# Patient Record
Sex: FEMALE | Employment: FULL TIME | URBAN - METROPOLITAN AREA
[De-identification: names, ages, dates, MRNs, and addresses within clinical notes are randomized per-mention and may not be internally consistent; named-entity substitution may affect disease eponyms.]

---

## 2019-07-25 ENCOUNTER — OFFICE VISIT (OUTPATIENT)
Dept: ORTHOPEDIC SURGERY | Age: 34
End: 2019-07-25

## 2019-07-25 VITALS
OXYGEN SATURATION: 98 % | HEIGHT: 70 IN | TEMPERATURE: 98.3 F | BODY MASS INDEX: 27.92 KG/M2 | WEIGHT: 195 LBS | DIASTOLIC BLOOD PRESSURE: 82 MMHG | HEART RATE: 95 BPM | SYSTOLIC BLOOD PRESSURE: 121 MMHG

## 2019-07-25 DIAGNOSIS — M54.2 NONSPECIFIC PAIN IN THE NECK REGION: ICD-10-CM

## 2019-07-25 DIAGNOSIS — Z79.899 ENCOUNTER FOR LONG-TERM (CURRENT) USE OF OTHER MEDICATIONS: ICD-10-CM

## 2019-07-25 DIAGNOSIS — M54.12 CHRONIC CERVICAL RADICULOPATHY: Primary | ICD-10-CM

## 2019-07-25 RX ORDER — TRAMADOL HYDROCHLORIDE 50 MG/1
50 TABLET ORAL
COMMUNITY
End: 2019-10-21 | Stop reason: SDUPTHER

## 2019-07-25 RX ORDER — CETIRIZINE HCL 10 MG
10 TABLET ORAL DAILY
COMMUNITY

## 2019-07-25 RX ORDER — ALPRAZOLAM 1 MG/1
1 TABLET ORAL
COMMUNITY

## 2019-07-25 RX ORDER — DEXTROAMPHETAMINE SACCHARATE, AMPHETAMINE ASPARTATE, DEXTROAMPHETAMINE SULFATE AND AMPHETAMINE SULFATE 7.5; 7.5; 7.5; 7.5 MG/1; MG/1; MG/1; MG/1
30 TABLET ORAL 3 TIMES DAILY
COMMUNITY

## 2019-07-25 RX ORDER — CYCLOBENZAPRINE HCL 10 MG
TABLET ORAL
Qty: 90 TAB | Refills: 0 | Status: SHIPPED | OUTPATIENT
Start: 2019-07-25 | End: 2019-10-21

## 2019-07-25 RX ORDER — TOPIRAMATE 50 MG/1
150 TABLET, FILM COATED ORAL DAILY
COMMUNITY

## 2019-07-25 RX ORDER — CYCLOBENZAPRINE HCL 10 MG
TABLET ORAL
COMMUNITY
End: 2019-10-21

## 2019-07-25 NOTE — PROGRESS NOTES
Lola [facility]alf[facility] [address].  [address], [address]  [place], [facility] [address]  Phone: [phone]  Fax: [phone]        Joan Rodriguez  : 1985  PCP: No primary care provider on file. NEW PATIENT      ASSESSMENT AND PLAN     Diagnoses and all orders for this visit:    1. Chronic cervical radiculopathy    2. Nonspecific pain in the neck region  -     AMB POC XRAY, SPINE, CERVICAL; 2 OR 3    3. Encounter for long-term (current) use of other medications  -     DRUG SCREEN UR - W/ CONFIRM; Future    Other orders  -     cyclobenzaprine (FLEXERIL) 10 mg tablet; Take 1 tab PO TID PRN spasms       1. Advised to stay active as tolerated. Do HEP   2. Note for ergonomic evaluation of work station. 3. Discussed that we are not a pain management facility, we are unable to fill chronic daily opioids. 4. PA & ORT & UDS done today   5. Continue PRN Flexeril and Tramadol  6. Avoid overhead lifting or reaching. 7. Discussed possibly pursuing PM with Dr. Dianna Zamarripa as he can do C [place] also  8. Given information on cervical and upper back exerices    F/U 3 months      CHIEF COMPLAINT  Laney Maurer is seen today as a self referral for complaints of neck and RUE pain. HISTORY OF PRESENT ILLNESS  Laney Maurer is a 29 y.o. female. RHD. Today pt c/o neck and intermittent RUE pain of 8 year duration. Pt has had trauma that caused pain. Pt reports she was in an [facility] , but did not have pain at that time. Pt notes infrequent spells of R shoulder and arm pain. She admits to cervicogenic headaches. She c/o neck pain with prolonged sitting, particularly in hunched positions. Pt notes a pupillary problem in R eye that is being evaluated and treated. Location of pain: neck ache  Does pain radiate into extremities: intermittent R hand numbness. Intermittent RUE pain. Does patient have weakness: no   Pt denies saddle paresthesias.     Medications pt is on: Flexeril 10mg a few days every month or so. Tramadol 50mg PRN rare use. Topamax 150mg QHS for migraines. Xanax. Pt denies recent ED visits or hospitalizations. Denies persistent fevers, chills, weight changes, neurogenic bowel or bladder symptoms. Treatments patient has tried:  Physical therapy:Yes  Doing HEP: Yes  Non-opioid medications: Yes Failed Elavil  Spinal injections: No. TPI no benefit  Spinal surgery- No.   Last C MRI 2017: R sided narrowing C4-5-6     reviewed. Last analgesic 8/2018 #[address] [place].  PMHx of migraines, ADD, anxiety. Pt sees [facility] for mental health. Pt moved to [place] recently from [place]. 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 Pain Assessment  7/25/2019   Location of Pain Neck;Finger   Location Modifiers Right   Severity of Pain 2   Quality of Pain Dull;Aching   Duration of Pain Persistent   Frequency of Pain Constant   Aggravating Factors Other (Comment)   Relieving Factors Rest;NSAID         PAST MEDICAL HISTORY   Past Medical History:   Diagnosis Date    Migraines        History reviewed. No pertinent surgical history. MEDICATIONS      Current Outpatient Medications:     ALPRAZolam (XANAX) 1 mg tablet, Take  by mouth., Disp: , Rfl:     topiramate (TOPAMAX) 50 mg tablet, Take  by mouth two (2) times a day., Disp: , Rfl:     dextroamphetamine-amphetamine (ADDERALL) 30 mg tablet, Take 30 mg by mouth., Disp: , Rfl:     cetirizine (ZYRTEC) 10 mg tablet, Take  by mouth., Disp: , Rfl:     traMADol (ULTRAM) 50 mg tablet, Take 50 mg by mouth every six (6) hours as needed for Pain., Disp: , Rfl:     cyclobenzaprine (FLEXERIL) 10 mg tablet, Take  by mouth three (3) times daily as needed for Muscle Spasm(s). , Disp: , Rfl:     cyclobenzaprine (FLEXERIL) 10 mg tablet, Take 1 tab PO TID PRN spasms, Disp: 90 Tab, Rfl: 0     ALLERGIES    Allergies   Allergen Reactions    Latex Rash    Penicillins Anaphylaxis    Doxycycline Unknown (comments)    Levofloxacin Unknown (comments)    Nsaids (Non-Steroidal Anti-Inflammatory Drug) Unknown (comments)    Sulfa (Sulfonamide Antibiotics) Unknown (comments)          SOCIAL HISTORY    Social History     Socioeconomic History    Marital status:      Spouse name: Not on file    Number of children: Not on file    Years of education: Not on file    Highest education level: Not on file   Occupational History    Not on file   Social Needs    Financial resource strain: Not on file    Food insecurity:     Worry: Not on file     Inability: Not on file    Transportation needs:     Medical: Not on file     Non-medical: Not on file   Tobacco Use    Smoking status: Smoker, Current Status Unknown    Smokeless tobacco: Never Used   Substance and Sexual Activity    Alcohol use: Not on file    Drug use: Not on file    Sexual activity: Not on file   Lifestyle    Physical activity:     Days per week: Not on file     Minutes per session: Not on file    Stress: Not on file   Relationships    Social connections:     Talks on phone: Not on file     Gets together: Not on file     Attends [employer] service: Not on file     Active member of club or organization: Not on file     Attends meetings of clubs or organizations: Not on file     Relationship status: Not on file    Intimate partner violence:     Fear of current or ex partner: Not on file     Emotionally abused: Not on file     Physically abused: Not on file     Forced sexual activity: Not on file   Other Topics Concern    Not on file   Social History Narrative    Not on file       FAMILY HISTORY  No family history on file. REVIEW OF SYSTEMS  Review of Systems   Constitutional: Negative for chills, fever and weight loss. Respiratory: Negative for shortness of breath. Cardiovascular: Negative for chest pain. Gastrointestinal: Negative for constipation. Negative for fecal incontinence   Genitourinary: Negative for dysuria.         Negative for urinary incontinence   Musculoskeletal: Per HPI   Skin: Negative for rash. Neurological: Positive for tingling and headaches. Negative for dizziness, tremors and focal weakness. Endo/Heme/Allergies: Does not bruise/bleed easily. Psychiatric/Behavioral: The patient does not have insomnia. PHYSICAL EXAMINATION  Visit Vitals  /82 (BP 1 Location: Left arm, BP Patient Position: Sitting)   Pulse 95   Temp 98.3 °F (36.8 °C) (Oral)   Ht 5' 10\" (1.778 m)   Wt 195 lb (88.5 kg)   SpO2 98%   BMI 27.98 kg/m²          Accompanied by self. Constitutional:  Well developed, well nourished, in no acute distress. Psychiatric: Affect and mood are appropriate. Integumentary: No rashes or abrasions noted on exposed areas. Cardiovascular/Peripheral Vascular: No peripheral edema is noted BLE. SPINE/MUSCULOSKELETAL EXAM    Cervical spine:  Neck is midline. Normal muscle tone. No focal atrophy is noted. Tenderness to palpation R occipital notch. Negative Spurling's sign. Negative Tinel's sign. Negative Ramos's sign. MOTOR:      Biceps  Triceps Deltoids Wrist Ext Wrist Flex Hand Intrin   Right +4/5 +4/5 +4/5 +4/5 +4/5 +4/5   Left +4/5 +4/5 +4/5 +4/5 +4/5 +4/5       DTRs are 1+ biceps, triceps, brachioradialis, patella, and Achilles. Ambulation without assistive device. FWB. RADIOGRAPHS  Cervical spine xray films reviewed:  1) NSS     Written by Kassidy Akers, as dictated by Primo Hutson MD.    I, Dr. Primo Hutson MD, confirm that all documentation is accurate. Ms. Zackary Greene may have a reminder for a \"due or due soon\" health maintenance. I have asked that she contact her primary care provider for follow-up on this health maintenance.

## 2019-07-25 NOTE — LETTER
7/25/2019 2:15 PM 
 
Ms. Calvin Salomon 
[address] Located within [facility] [phone] [id number] To Whom It May Concern: 
 
Eleonora Dahl is currently under the care of [address] [place] [address]. She was seen in the office today. She has chronic cervical radiculopathy and would benefit from an ergonomic evaluation of her work station. If there are questions or concerns please have the patient contact our office. Sincerely, Rebecca Oliver MD

## 2019-07-25 NOTE — PATIENT INSTRUCTIONS
Neck: Exercises  Introduction  Here are some examples of exercises for you to try. The exercises may be suggested for a condition or for rehabilitation. Start each exercise slowly. Ease off the exercises if you start to have pain. You will be told when to start these exercises and which ones will work best for you. How to do the exercises  Neck stretch    1. This stretch works best if you keep your shoulder down as you lean away from it. To help you remember to do this, start by relaxing your shoulders and lightly holding on to your thighs or your chair. 2. Tilt your head toward your shoulder and hold for 15 to 30 seconds. Let the weight of your head stretch your muscles. 3. If you would like a little added stretch, use your hand to gently and steadily pull your head toward your shoulder. For example, keeping your right shoulder down, lean your head to the left. 4. Repeat 2 to 4 times toward each shoulder. Diagonal neck stretch    1. Turn your head slightly toward the direction you will be stretching, and tilt your head diagonally toward your chest and hold for 15 to 30 seconds. 2. If you would like a little added stretch, use your hand to gently and steadily pull your head forward on the diagonal.  3. Repeat 2 to 4 times toward each side. Dorsal glide stretch    1. Sit or stand tall and look straight ahead. 2. Slowly tuck your chin as you glide your head backward over your body  3. Hold for a count of 6, and then relax for up to 10 seconds. 4. Repeat 8 to 12 times. Chest and shoulder stretch    1. Sit or stand tall and glide your head backward as in the dorsal glide stretch. 2. Raise both arms so that your hands are next to your ears. 3. Take a deep breath, and as you breathe out, lower your elbows down and behind your back. You will feel your shoulder blades slide down and together, and at the same time you will feel a stretch across your chest and the front of your shoulders.   4. Hold for about 6 seconds, and then relax for up to 10 seconds. 5. Repeat 8 to 12 times. Strengthening: Hands on head    1. Move your head backward, forward, and side to side against gentle pressure from your hands, holding each position for about 6 seconds. 2. Repeat 8 to 12 times. Follow-up care is a key part of your treatment and safety. Be sure to make and go to all appointments, and call your doctor if you are having problems. It's also a good idea to know your test results and keep a list of the medicines you take. Where can you learn more? Go to http://jozef-grace.info/. Enter P975 in the search box to learn more about \"Neck: Exercises. \"  Current as of: September 20, 2018  Content Version: 12.1  © 9357-8875 Healthwise, Incorporated. Care instructions adapted under license by [employer] (which disclaims liability or warranty for this information). If you have questions about a medical condition or this instruction, always ask your healthcare professional. [address] any warranty or liability for your use of this information. Healthy Upper Back: Exercises  Introduction  Here are some examples of exercises for your upper back. Start each exercise slowly. Ease off the exercise if you start to have pain. Your doctor or physical therapist will tell you when you can start these exercises and which ones will work best for you. How to do the exercises  Lower neck and upper back stretch    1. Stretch your arms out in front of your body. Clasp one hand on top of your other hand. 2. Gently reach out so that you feel your shoulder blades stretching away from each other. 3. Gently bend your head forward. 4. Hold for 15 to 30 seconds. 5. Repeat 2 to 4 times. Midback stretch    1. Kneel on the floor, and sit back on your ankles. 2. Lean forward, place your hands on the floor, and stretch your arms out in front of you.  Rest your head between your arms.  3. Gently push your chest toward the floor, reaching as far in front of you as possible. 4. Hold for 15 to 30 seconds. 5. Repeat 2 to 4 times. Shoulder rolls    1. Sit comfortably with your feet shoulder-width apart. You can also do this exercise while standing. 2. Roll your shoulders up, then back, and then down in a smooth, circular motion. 3. Repeat 2 to 4 times. Wall push-up    1. Stand against a wall with your feet about 12 to 24 inches back from the wall. If you feel any pain when you do this exercise, stand closer to the wall. 2. Place your hands on the wall slightly wider apart than your shoulders, and lean forward. 3. Gently lean your body toward the wall. Then push back to your starting position. Keep the motion smooth and controlled. 4. Repeat 8 to 12 times. Resisted shoulder blade squeeze    1. Sit or stand, holding the band in both hands in front of you. Keep your elbows close to your sides, bent at a 90-degree angle. Your palms should face up. 2. Squeeze your shoulder blades together, and move your arms to the outside, stretching the band. Be sure to keep your elbows at your sides while you do this. 3. Relax. 4. Repeat 8 to 12 times. Resisted rows    1. Put the band around a solid object, such as a bedpost, at about waist level. Hold one end of the band in each hand. 2. With your elbows at your sides and bent to 90 degrees, pull the band back to move your shoulder blades toward each other. Return to the starting position. 3. Repeat 8 to 12 times. Follow-up care is a key part of your treatment and safety. Be sure to make and go to all appointments, and call your doctor if you are having problems. It's also a good idea to know your test results and keep a list of the medicines you take. Where can you learn more? Go to http://jozef-grace.info/. Enter A410 in the search box to learn more about \"Healthy Upper Back: Exercises. \"  Current as of: September 20, 2018  Content Version: 12.1  © 8557-3494 Healthwise, Incorporated. Care instructions adapted under license by [employer] (which disclaims liability or warranty for this information). If you have questions about a medical condition or this instruction, always ask your healthcare professional. [address] any warranty or liability for your use of this information.

## 2019-07-30 ENCOUNTER — TELEPHONE (OUTPATIENT)
Dept: ORTHOPEDIC SURGERY | Age: 34
End: 2019-07-30

## 2019-07-30 ENCOUNTER — DOCUMENTATION ONLY (OUTPATIENT)
Dept: ORTHOPEDIC SURGERY | Age: 34
End: 2019-07-30

## 2019-07-30 RX ORDER — NALOXONE HYDROCHLORIDE 4 MG/.1ML
SPRAY NASAL
Qty: 2 EACH | Refills: 0 | Status: SHIPPED | OUTPATIENT
Start: 2019-07-30 | End: 2021-12-14

## 2019-07-30 NOTE — TELEPHONE ENCOUNTER
Spoke with patient, informed of NP [place] message from previous message stating that due to her taking Xanax along with Tramadol, we have to prescribe Narcan for accidental overdose. Patient stated understanding and that she thought that was only necessary with opioid medications not Tramadol. No further actions needed at this time.

## 2019-07-30 NOTE — PROGRESS NOTES
Spoke with patient, informed of NP [place] message. She stated understanding, no further actions needed at this time.

## 2019-07-30 NOTE — TELEPHONE ENCOUNTER
Patient called asking why she received a prescription for Narcan.  Please advise patient at [phone]

## 2019-07-30 NOTE — PROGRESS NOTES
UDS done on 7/25/19  negative for ultram (last dose was 2 days prior)    should get repeat UDS at next visit    UDS was + for xanax. Pt needs rx for narcan. I will send it into the pharmacy. Please call pt and let her know.

## 2019-10-21 ENCOUNTER — OFFICE VISIT (OUTPATIENT)
Dept: ORTHOPEDIC SURGERY | Age: 34
End: 2019-10-21

## 2019-10-21 ENCOUNTER — DOCUMENTATION ONLY (OUTPATIENT)
Dept: ORTHOPEDIC SURGERY | Age: 34
End: 2019-10-21

## 2019-10-21 VITALS
TEMPERATURE: 98.4 F | BODY MASS INDEX: 28.03 KG/M2 | RESPIRATION RATE: 20 BRPM | HEART RATE: 96 BPM | HEIGHT: 70 IN | SYSTOLIC BLOOD PRESSURE: 126 MMHG | DIASTOLIC BLOOD PRESSURE: 68 MMHG | WEIGHT: 195.8 LBS

## 2019-10-21 DIAGNOSIS — M54.12 CHRONIC CERVICAL RADICULOPATHY: Primary | ICD-10-CM

## 2019-10-21 RX ORDER — TRAMADOL HYDROCHLORIDE 50 MG/1
50 TABLET ORAL
Qty: 30 TAB | Refills: 0 | Status: SHIPPED | OUTPATIENT
Start: 2019-10-21 | End: 2019-11-20

## 2019-10-21 NOTE — PROGRESS NOTES
Pt dropped off FMLA forms to be completed. Pt informed 7-10 bus days, and will pick forms up when completed.     FORMS SCANNED INTO CHART!!!!

## 2019-10-21 NOTE — PROGRESS NOTES
Lola Arredondo [facility] [address]  [address], [address]  [place], [facility]  Phone: [phone]  Fax: [phone]        Mc Sepulveda  : 1985  PCP: Bonnita Bamberger., MD    PROGRESS NOTE      ASSESSMENT AND PLAN    Diagnoses and all orders for this visit:    1. Chronic cervical radiculopathy  -     traMADol (ULTRAM) 50 mg tablet; Take 1 Tab by mouth daily as needed for Pain for up to 30 days. Indications: Neuropathic Pain  -     MRI CERV SPINE WO CONT; Future       1. 35yo RHD  with worsening neck pain, loss of dexterity, weakness. 2. Safe use of opioids discussed with pt. 3. MRI cervical spine - increased neck pain, N/T/W bilateral UEs  4. Discussed that we are not a chronic pain management facility, we are unable to fill on-going chronic opioid medications. 5. Rx for Tramadol 30 tab, no refill  6. Discussed life style modification, PT, medication, spinal injection, and surgery as treatment options   7. Given information on neuropathic pain  8. Will await MRI and fill out FMLA accordingly. F/U after MRI    Risks and benefits of ongoing opiate therapy have been reviewed with the patient. Per review of available records and patients , there are not signs of overuse, misuse, diversion, or concerning side effects. UDS results have been consistent. Pt has a good risk to benefit ratio which allows the pt to function in a home environment without side effects. HISTORY OF PRESENT ILLNESS  Rianna Brand is a 36 y.o. female. RHD. Pt presents to the office for a f/u visit for chronic neck pain and medication management. I last saw her about 3 months ago. Feels she's getting worse. She would like to avoid cortisone injections. Pt reports increased neck pain with work currently. She c/o spasms, which worsen her migraines. She notes she had an ergonomic evaluation, but nothing has been changed yet.  Her chair adjusts now, but her monitor does not move. She states her work table is still too low. She admits to weakness in her hands, drops objects, and states this worsens through the day. Pt reports pain does intermittently radiate into the RUE with numbness. She notes she has been tripping over her R foot. Admits to slight imbalance. Pt denies saddle paresthesias. She admits she has missed a couple days of work due to upper back spasm and would like to fill out FMLA. Pt states she has been using Topamax 150mg QHS for migraines, rare Tramadol 50mg PRN -past few weeks 5 days per week, (still using Rx from [place]), Flexeril 10mg PRN with some relief. Pt denies any dizziness, confusion, uncontrolled constipation, and cravings due to controlled substances. Pt denies recent ED visits or hospitalizations. Denies persistent fevers, chills, weight changes, neurogenic bowel or bladder symptoms. PMHx of migraines, ADD, anxiety. Pt sees [facility] for mental health. Pt moved to [facility] from [place]. 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 Does not heavy lifting. Has to stay in forward flexed positions which aggravate her neck. Computer not at eye level. Pain effects sleep, work, standing, play and recreational activities, and her ability to perform ADLs. Pt has tried:  Physical therapy:Yes  Doing HEP: Yes  Non-opioid medications: Yes Failed Elavil, Baclofen, Skelaxin, Robaxin, Tizanidine,   Spinal injections: No. TPI no benefit  Spinal surgery- No.   Last C MRI 2017: R sided narrowing C4-5-6    Opioid Assessment    Opioid Risk Tool Reviewed: YES, Score = 1  Aberrant behaviors: None. Urine Drug Screen: reviewed and up to date. Controlled substance agreement on file: Yes     reviewed:yes No rx for Tramadol noted since last visit. No other opioids. Concomitant use of a benzodiazepine: no  Naloxone prescription is warranted. It has been provided or is already on file. Reports that pain would be a 5-7/10 w/out medication and that it goes down to a 3/10 after medication. This enables the pt to be functional, achieve ADLs and engage in social activities. Pain Assessment  10/21/2019   Location of Pain Neck;Finger; Foot   Location Modifiers Right;Left   Severity of Pain 5   Quality of Pain Aching; Sharp   Quality of Pain Comment NUMBNESS   Duration of Pain Persistent   Frequency of Pain Constant   Aggravating Factors (No Data)   Aggravating Factors Comment working at the computer, looking down   Limiting Behavior Some   Relieving Factors Rest       PAST MEDICAL HISTORY   Past Medical History:   Diagnosis Date    Migraines        History reviewed. No pertinent surgical history. Shantel Ask MEDICATIONS      Current Outpatient Medications   Medication Sig Dispense Refill    traMADol (ULTRAM) 50 mg tablet Take 1 Tab by mouth daily as needed for Pain for up to 30 days. Indications: Neuropathic Pain 30 Tab 0    naloxone (NARCAN) 4 mg/actuation nasal spray Use 1 spray intranasally, then discard. Repeat with new spray every 2 min as needed for opioid overdose symptoms, alternating nostrils. 2 Each 0    ALPRAZolam (XANAX) 1 mg tablet Take  by mouth.  topiramate (TOPAMAX) 50 mg tablet Take  by mouth two (2) times a day.  dextroamphetamine-amphetamine (ADDERALL) 30 mg tablet Take 30 mg by mouth.  cetirizine (ZYRTEC) 10 mg tablet Take  by mouth.           ALLERGIES    Allergies   Allergen Reactions    Latex Rash    Penicillins Anaphylaxis    Doxycycline Unknown (comments)    Levofloxacin Unknown (comments)    Nsaids (Non-Steroidal Anti-Inflammatory Drug) Unknown (comments)    Sulfa (Sulfonamide Antibiotics) Unknown (comments)          SOCIAL HISTORY    Social History     Socioeconomic History    Marital status:      Spouse name: Not on file    Number of children: Not on file    Years of education: Not on file    Highest education level: Not on file Occupational History    Not on file   Social Needs    Financial resource strain: Not on file    Food insecurity:     Worry: Not on file     Inability: Not on file    Transportation needs:     Medical: Not on file     Non-medical: Not on file   Tobacco Use    Smoking status: Smoker, Current Status Unknown    Smokeless tobacco: Never Used   Substance and Sexual Activity    Alcohol use: Not on file    Drug use: Not on file    Sexual activity: Not on file   Lifestyle    Physical activity:     Days per week: Not on file     Minutes per session: Not on file    Stress: Not on file   Relationships    Social connections:     Talks on phone: Not on file     Gets together: Not on file     Attends Orthodoxy service: Not on file     Active member of club or organization: Not on file     Attends meetings of clubs or organizations: Not on file     Relationship status: Not on file    Intimate partner violence:     Fear of current or ex partner: Not on file     Emotionally abused: Not on file     Physically abused: Not on file     Forced sexual activity: Not on file   Other Topics Concern    Not on file   Social History Narrative    Not on file       FAMILY HISTORY  History reviewed. No pertinent family history. REVIEW OF SYSTEMS  Review of Systems   Constitutional: Negative for chills, fever and weight loss. Respiratory: Negative for shortness of breath. Cardiovascular: Negative for chest pain. Gastrointestinal: Negative for constipation. Negative for fecal incontinence   Genitourinary: Negative for dysuria. Negative for urinary incontinence   Musculoskeletal:        Per HPI   Skin: Negative for rash. Neurological: Positive for tingling, focal weakness and headaches. Negative for dizziness and tremors. Endo/Heme/Allergies: Does not bruise/bleed easily. Psychiatric/Behavioral: The patient does not have insomnia.         PHYSICAL EXAMINATION  Visit Vitals  /68 (BP 1 Location: Left arm, BP Patient Position: Sitting)   Pulse 96   Temp 98.4 °F (36.9 °C) (Oral)   Resp 20   Ht 5' 10\" (1.778 m)   Wt 195 lb 12.8 oz (88.8 kg)   BMI 28.09 kg/m²         Accompanied by self. Constitutional:  Well developed, well nourished, in no acute distress. Psychiatric: Affect and mood are appropriate. Integumentary: No rashes or abrasions noted on exposed areas. Cardiovascular/Peripheral Vascular: No peripheral edema is noted BLE. SPINE/MUSCULOSKELETAL EXAM    Cervical spine:  Neck is midline. Normal muscle tone. No focal atrophy is noted. Tenderness to palpation B/L occipital notch, B/L upper trapezii. Negative Spurling's sign. Positive Tinel's sign L wrist and elbow. Negative Ramos's sign. MOTOR:      Biceps  Triceps Deltoids Wrist Ext Wrist Flex Hand Intrin   Right +4/5 +4/5 +4/5 +4/5 +4/5 4/5   Left +4/5 +4/5 +4/5 +4/5 +4/5 4/5   Pinch weakness bilaterally. DTRs are 1+ biceps, triceps, brachioradialis. Mild difficulty with tandem gait. Ambulation without assistive device. FWB. Written by Lucian Browne, as dictated by Sj Ahuja MD.    I, Dr. Sj Ahuja MD, confirm that all documentation is accurate. Ms. Allison Morton may have a reminder for a \"due or due soon\" health maintenance. I have asked that she contact her primary care provider for follow-up on this health maintenance.

## 2019-10-21 NOTE — PROGRESS NOTES
Verbal order entered per Dr. Cheko Oneal as documented on blue sheet:MRI C-spine due to increased neck pain, N/T/W bilateral upper extremities

## 2019-10-21 NOTE — PATIENT INSTRUCTIONS
Neuropathic Pain: Care Instructions  Your Care Instructions    Neuropathic pain is caused by pressure on or damage to your nerves. It's often simply called nerve pain. Some people feel this type of pain all the time. For others, it comes and goes. Diabetes, shingles, or an injury can cause nerve pain. Many people say the pain feels sharp, burning, or stabbing. But some people feel it as a dull ache. In some cases, it makes your skin very sensitive. So touch, pressure, and other sensations that did not hurt before may now cause pain. It's important to know that this kind of pain is real and can affect your quality of life. It's also important to know that treatment can help. Treatment includes pain medicines, exercise, and physical therapy. Medicines can help reduce the number of pain signals that travel over the nerves. This can make the painful areas less sensitive. It can also help you sleep better and improve your mood. But medicines are only one part of successful treatment. Most people do best with more than one kind of treatment. Your doctor may recommend that you try cognitive-behavioral therapy and stress management. Or, if needed, you may decide to try to quit smoking, lower your blood pressure, or better control blood sugar. These kinds of healthy changes can also make a difference. If you feel that your treatment is not working, talk to your doctor. And be sure to tell your doctor if you think you might be depressed or anxious. These are common problems that can also be treated. Follow-up care is a key part of your treatment and safety. Be sure to make and go to all appointments, and call your doctor if you are having problems. It's also a good idea to know your test results and keep a list of the medicines you take. How can you care for yourself at home? · Be safe with medicines. Read and follow all instructions on the label.   ? If the doctor gave you a prescription medicine for pain, take it as prescribed. ? If you are not taking a prescription pain medicine, ask your doctor if you can take an over-the-counter medicine. · Save hard tasks for days when you have less pain. Follow a hard task with an easy task. And remember to take breaks. · Relax, and reduce stress. You may want to try deep breathing or meditation. These can help. · Keep moving. Gentle, daily exercise can help reduce pain. Your doctor or physical therapist can tell you what type of exercise is best for you. This may include walking, swimming, and stationary biking. It may also include stretches and range-of-motion exercises. · Try heat, cold packs, and massage. · Get enough sleep. Constant pain can make you more tired. If the pain makes it hard to sleep, talk with your doctor. · Think positively. Your thoughts can affect your pain. Do fun things to distract yourself from the pain. See a movie, read a book, listen to music, or spend time with a friend. · Keep a pain diary. Try to write down how strong your pain is and what it feels like. Also try to notice and write down how your moods, thoughts, sleep, activities, and medicine affect your pain. These notes can help you and your doctor find the best ways to treat your pain. Reducing constipation caused by pain medicine  Pain medicines often cause constipation. To reduce constipation:  · Include fruits, vegetables, beans, and whole grains in your diet each day. These foods are high in fiber. · Drink plenty of fluids, enough so that your urine is light yellow or clear like water. If you have kidney, heart, or liver disease and have to limit fluids, talk with your doctor before you increase the amount of fluids you drink. · Get some exercise every day. Build up slowly to 30 to 60 minutes a day on 5 or more days of the week. · Take a fiber supplement, such as Citrucel or Metamucil, every day if needed. Read and follow all instructions on the label.   · Schedule time each day for a bowel movement. Having a daily routine may help. Take your time and do not strain when having a bowel movement. · Ask your doctor about a laxative. The goal is to have one easy bowel movement every 1 to 2 days. Do not let constipation go untreated for more than 3 days. When should you call for help? Call your doctor now or seek immediate medical care if:    · You feel sad, anxious, or hopeless for more than a few days. This could mean you are depressed. Depression is common in people who have a lot of pain. But it can be treated.     · You have trouble with bowel movements, such as:  ? No bowel movement in 3 days. ? Blood in the anal area, in your stool, or on the toilet paper. ? Diarrhea for more than 24 hours.    Watch closely for changes in your health, and be sure to contact your doctor if:    · Your pain is getting worse.     · You can't sleep because of pain.     · You are very worried or anxious about your pain.     · You have trouble taking your pain medicine.     · You have any concerns about your pain medicine or its side effects.     · You have vomiting or cramps for more than 2 hours. Where can you learn more? Go to http://jozef-grace.info/. Enter Z931 in the search box to learn more about \"Neuropathic Pain: Care Instructions. \"  Current as of: March 28, 2019  Content Version: 12.2  © 4435-3984 [employer]. Care instructions adapted under license by [employer] (which disclaims liability or warranty for this information). If you have questions about a medical condition or this instruction, always ask your healthcare professional. [place] [address] any warranty or liability for your use of this information.

## 2019-10-21 NOTE — PROGRESS NOTES
Favio Loza presents today for   Chief Complaint   Patient presents with    Neck Pain     fu       Is someone accompanying this pt? NO    Is the patient using any DME equipment during OV? NO    Depression Screening:  3 most recent PHQ Screens 10/21/2019   Little interest or pleasure in doing things Not at all   Feeling down, depressed, irritable, or hopeless Not at all   Total Score PHQ 2 0       Learning Assessment:  Learning Assessment 10/21/2019   PRIMARY LEARNER Patient   PRIMARY LANGUAGE ENGLISH   LEARNER PREFERENCE PRIMARY DEMONSTRATION   ANSWERED BY patient   RELATIONSHIP SELF         OPIOID RISK TOOL  Opioid Risk Tool 7/25/2019   Family history of alcohol abuse? 0   Family history of illegal drug abuse? 0   Family history of prescription drug abuse? 0   Personal history of alcohol abuse? 0   Personal history of illegal drug abuse? 0   Personal history of prescription drug abuse? 0   Age range between 17-45? 1   History of preadolescent sexual abuse? 0   ADD, OCD, bipolar, schizophrenia? 0   Depression? 0   Opioid Risk Total Score 1       Coordination of Care:  1. Have you been to the ER, urgent care clinic since your last visit? NO  Hospitalized since your last visit? NO    2. Have you seen or consulted any other health care providers outside of the [facility] since your last visit? NO Include any pap smears or colon screening.  NO    Last  Checked 10/21/19

## 2019-10-22 ENCOUNTER — DOCUMENTATION ONLY (OUTPATIENT)
Dept: ORTHOPEDIC SURGERY | Age: 34
End: 2019-10-22

## 2019-11-06 ENCOUNTER — DOCUMENTATION ONLY (OUTPATIENT)
Dept: ORTHOPEDIC SURGERY | Age: 34
End: 2019-11-06

## 2019-11-06 ENCOUNTER — OFFICE VISIT (OUTPATIENT)
Dept: ORTHOPEDIC SURGERY | Age: 34
End: 2019-11-06

## 2019-11-06 VITALS
HEART RATE: 98 BPM | DIASTOLIC BLOOD PRESSURE: 82 MMHG | WEIGHT: 192.4 LBS | OXYGEN SATURATION: 100 % | HEIGHT: 70 IN | SYSTOLIC BLOOD PRESSURE: 121 MMHG | RESPIRATION RATE: 20 BRPM | BODY MASS INDEX: 27.54 KG/M2 | TEMPERATURE: 98.3 F

## 2019-11-06 DIAGNOSIS — M79.18 MYOFASCIAL PAIN: ICD-10-CM

## 2019-11-06 DIAGNOSIS — R20.2 PARESTHESIA OF BOTH FEET: ICD-10-CM

## 2019-11-06 DIAGNOSIS — R20.2 PARESTHESIA OF BOTH HANDS: ICD-10-CM

## 2019-11-06 DIAGNOSIS — M47.812 CERVICAL SPONDYLOSIS: Primary | ICD-10-CM

## 2019-11-06 RX ORDER — DULOXETIN HYDROCHLORIDE 20 MG/1
20 CAPSULE, DELAYED RELEASE ORAL
Qty: 30 CAP | Refills: 1 | Status: SHIPPED | OUTPATIENT
Start: 2019-11-06 | End: 2020-07-28 | Stop reason: ALTCHOICE

## 2019-11-06 NOTE — PROGRESS NOTES
Dre Ann presents today for   Chief Complaint   Patient presents with    Neck Pain     MRI fu       Is someone accompanying this pt? NO    Is the patient using any DME equipment during OV? NO    Depression Screening:  3 most recent PHQ Screens 11/6/2019   Little interest or pleasure in doing things Not at all   Feeling down, depressed, irritable, or hopeless Not at all   Total Score PHQ 2 0       Learning Assessment:  Learning Assessment 10/21/2019   PRIMARY LEARNER Patient   PRIMARY LANGUAGE ENGLISH   LEARNER PREFERENCE PRIMARY DEMONSTRATION   ANSWERED BY patient   RELATIONSHIP SELF       Abuse Screening:  No flowsheet data found. Fall Risk  No flowsheet data found. OPIOID RISK TOOL  Opioid Risk Tool 7/25/2019   Family history of alcohol abuse? 0   Family history of illegal drug abuse? 0   Family history of prescription drug abuse? 0   Personal history of alcohol abuse? 0   Personal history of illegal drug abuse? 0   Personal history of prescription drug abuse? 0   Age range between 17-45? 1   History of preadolescent sexual abuse? 0   ADD, OCD, bipolar, schizophrenia? 0   Depression? 0   Opioid Risk Total Score 1       Coordination of Care:  1. Have you been to the ER, urgent care clinic since your last visit? NO  Hospitalized since your last visit? NO    2. Have you seen or consulted any other health care providers outside of the [address] [place] [address] since your last visit? NO Include any pap smears or colon screening.  NO    Last  Checked 11/6/19

## 2019-11-06 NOTE — PATIENT INSTRUCTIONS
Electromyogram (EMG) and Nerve Conduction Studies: About These Tests  What are they? An electromyogram (EMG) measures the electrical activity of your muscles when you are not using them (at rest) and when you tighten them (muscle contraction). Nerve conduction studies (NCS) measure how well and how fast the nerves can send electrical signals. EMG and nerve conduction studies are often done together. If they are done together, the nerve conduction studies are done before the EMG. Why are they done? You may need an EMG to find diseases that damage your muscles or nerves or to find why you cannot move your muscles (paralysis), why they feel weak, or why they twitch. You may need nerve conduction studies to find damage to the nerves that lead from the brain and spinal cord to the rest of the body (peripheral nervous system). Nerve conduction studies are often used to help find nerve disorders, such as carpal tunnel syndrome. How can you prepare for these tests? · Tell your doctors ALL the medicines, vitamins, supplements, and herbal remedies you take. Some medicines can affect the test results. You may need to stop taking some medicines before you have this test.     · If you take aspirin or some other blood thinner, be sure to talk to your doctor. He or she will tell you if you should stop taking it before your test. Make sure that you understand exactly what your doctor wants you to do.     · Wear loose-fitting clothing. You may be given a hospital gown to wear.     · The electrodes for the test are attached to your skin. Your skin needs to be clean and free of sprays, oils, creams, and lotions. What happens during the tests? You lie on a table or bed or sit in a reclining chair so your muscles are relaxed. For an EMG:  · Your doctor will insert a needle electrode into a muscle.  This will record the electrical activity while the muscle is at rest. You may feel a quick, sharp pain when the needle electrode is put into a muscle. · Your doctor will ask you to tighten the same muscle slowly and steadily while the electrical activity is recorded. · Your doctor may move the electrode to a different area of the muscle or a different muscle. For nerve conduction studies:  · Your doctor will attach two types of electrodes to your skin. ? One type of electrode is placed over a nerve and will give the nerve an electrical pulse. ? The other type of electrode is placed over the muscle that the nerve controls. It will record how long it takes the muscle to react to the electrical pulse. · You will be able to feel the electrical pulses. They are small shocks and are safe. What else should you know about these tests? · After an EMG, you may be sore and have a tingling feeling in your muscles for up to 2 days. You may have small bruises or swelling at the needle site. · For an EMG, you may be asked to sign a consent form. Talk to your doctor about any concerns you have about the need for the test, its risks, how it will be done, or what the results will mean. How long do they take? · An EMG may take 30 to 60 minutes. · Nerve conduction tests may take from 15 minutes to 1 hour or more. It depends on how many nerves and muscles your doctor tests. What happens after these tests? · If any of the test areas are sore:  ? Put ice or a cold pack on the area for 10 to 20 minutes at a time. Put a thin cloth between the ice and your skin. ? Take an over-the-counter pain medicine, such as acetaminophen (Tylenol), ibuprofen (Advil, Motrin), or naproxen (Aleve). Be safe with medicines. Read and follow all instructions on the label. · You will probably be able to go home right away. · You can go back to your usual activities right away. When should you call for help?   Watch closely for changes in your health, and be sure to contact your doctor if:  · Muscle pain from an EMG test gets worse or you have swelling, tenderness, or pus at any of the needle sites. · You have any problems that you think may be from the test.  · You have any questions about the test or have not received your results. Follow-up care is a key part of your treatment and safety. Be sure to make and go to all appointments, and call your doctor if you are having problems. It's also a good idea to keep a list of the medicines you take. Ask your doctor when you can expect to have your test results. Where can you learn more? Go to http://jozef-grace.info/. Enter D884 in the search box to learn more about \"Electromyogram (EMG) and Nerve Conduction Studies: About These Tests. \"  Current as of: March 28, 2019  Content Version: 12.2  © 2945-3187 [employer]. Care instructions adapted under license by [employer] (which disclaims liability or warranty for this information). If you have questions about a medical condition or this instruction, always ask your healthcare professional. [address] any warranty or liability for your use of this information. Fibromyalgia: Care Instructions  Your Care Instructions    Fibromyalgia is a painful condition that is not completely understood by medical experts. The cause of fibromyalgia is not known. It can make you feel tired and ache all over. It causes tender spots at specific points of the body that hurt only when you press on them. You may have trouble sleeping, as well as other symptoms. These problems can upset your work and home life. Symptoms tend to come and go, although they may never go away completely. Fibromyalgia does not harm your muscles, joints, or organs. Follow-up care is a key part of your treatment and safety. Be sure to make and go to all appointments, and call your doctor if you are having problems. It's also a good idea to know your test results and keep a list of the medicines you take.   How can you care for yourself at home?  · Exercise often. Walk, swim, or bike to help with pain and sleep problems and to make you feel better. · Try to get a good night's sleep. Go to bed and get up at the same time each day, whether you feel rested or not. Make sure you have a good mattress and pillow. · Reduce stress. Avoid things that cause you stress, if you can. If not, work at making them less stressful. Learn to use biofeedback, guided imagery, meditation, or other methods to relax. · Make healthy changes. Eat a balanced diet, quit smoking, and limit alcohol and caffeine. · Use a heating pad set on low or take warm baths or showers for pain. Using cold packs for up to 20 minutes at a time can also relieve pain. Put a thin cloth between the cold pack and your skin. A gentle massage might help too. · Be safe with medicines. Take your medicines exactly as prescribed. Call your doctor if you think you are having a problem with your medicine. Your doctor may talk to you about taking antidepressant medicines. These medicines may improve sleep, relieve pain, and in some cases treat depression. · Learn about fibromyalgia. This makes coping easier. Then, take an active role in your treatment. · Think about joining a support group with others who have fibromyalgia to learn more and get support. When should you call for help? Watch closely for changes in your health, and be sure to contact your doctor if:    · You feel sad, helpless, or hopeless; lose interest in things you used to enjoy; or have other symptoms of depression.     · Your fibromyalgia symptoms get worse. Where can you learn more? Go to http://jozef-grace.info/. Enter V003 in the search box to learn more about \"Fibromyalgia: Care Instructions. \"  Current as of: March 28, 2019  Content Version: 12.2  © 4614-7519 [employer].  Care instructions adapted under license by [employer] (which disclaims liability or warranty for this information). If you have questions about a medical condition or this instruction, always ask your healthcare professional. [address] any warranty or liability for your use of this information. Learning About Sleeping Well  What does sleeping well mean? Sleeping well means getting enough sleep. How much sleep is enough varies among people. The number of hours you sleep is not as important as how you feel when you wake up. If you do not feel refreshed, you probably need more sleep. Another sign of not getting enough sleep is feeling tired during the day. The average total nightly sleep time is 7½ to 8 hours. Healthy adults may need a little more or a little less than this. Why is getting enough sleep important? Getting enough quality sleep is a basic part of good health. When your sleep suffers, your mood and your thoughts can suffer too. You may find yourself feeling more grumpy or stressed. Not getting enough sleep also can lead to serious problems, including injury, accidents, anxiety, and depression. What might cause poor sleeping? Many things can cause sleep problems, including:  · Stress. Stress can be caused by fear about a single event, such as giving a speech. Or you may have ongoing stress, such as worry about work or school. · Depression, anxiety, and other mental or emotional conditions. · Changes in your sleep habits or surroundings. This includes changes that happen where you sleep, such as noise, light, or sleeping in a different bed. It also includes changes in your sleep pattern, such as having jet lag or working a late shift. · Health problems, such as pain, breathing problems, and restless legs syndrome. · Lack of regular exercise. How can you help yourself? Here are some tips that may help you sleep more soundly and wake up feeling more refreshed. Your sleeping area  · Use your bedroom only for sleeping and sex.  A bit of light reading may help you fall asleep. But if it doesn't, do your reading elsewhere in the house. Don't watch TV in bed. · Be sure your bed is big enough to stretch out comfortably, especially if you have a sleep partner. · Keep your bedroom quiet, dark, and cool. Use curtains, blinds, or a sleep mask to block out light. To block out noise, use earplugs, soothing music, or a \"white noise\" machine. Your evening and bedtime routine  · Create a relaxing bedtime routine. You might want to take a warm shower or bath, listen to soothing music, or drink a cup of noncaffeinated tea. · Go to bed at the same time every night. And get up at the same time every morning, even if you feel tired. What to avoid  · Limit caffeine (coffee, tea, caffeinated sodas) during the day, and don't have any for at least 4 to 6 hours before bedtime. · Don't drink alcohol before bedtime. Alcohol can cause you to wake up more often during the night. · Don't smoke or use tobacco, especially in the evening. Nicotine can keep you awake. · Don't take naps during the day, especially close to bedtime. · Don't lie in bed awake for too long. If you can't fall asleep, or if you wake up in the middle of the night and can't get back to sleep within 15 minutes or so, get out of bed and go to another room until you feel sleepy. · Don't take medicine right before bed that may keep you awake or make you feel hyper or energized. Your doctor can tell you if your medicine may do this and if you can take it earlier in the day. If you can't sleep  · Imagine yourself in a peaceful, pleasant scene. Focus on the details and feelings of being in a place that is relaxing. · Get up and do a quiet or boring activity until you feel sleepy. · Don't drink any liquids after 6 p.m. if you wake up often because you have to go to the bathroom. Where can you learn more? Go to http://jozef-grace.info/.   Enter P697 in the search box to learn more about \"Learning About Sleeping Well. \"  Current as of: May 28, 2019  Content Version: 12.2  © 2740-8363 [employer]. Care instructions adapted under license by [employer] (which disclaims liability or warranty for this information). If you have questions about a medical condition or this instruction, always ask your healthcare professional. [place] [address] any warranty or liability for your use of this information.

## 2019-11-06 NOTE — PROGRESS NOTES
Verbal order entered per Dr. Arabella Patel as documented on blue sheet:Referral to Neurology. Cymbalta 20mg take 1 tab po with dinner. Disp 30 with 1 refill.

## 2019-11-06 NOTE — PROGRESS NOTES
[facility] [address]  [address], [address]  [place] [address]  Phone: [phone]  Fax: [phone]        Elvi Cotto  : 1985  PCP: Kaykay Lewis MD    PROGRESS NOTE      ASSESSMENT AND PLAN    Diagnoses and all orders for this visit:    1. Cervical spondylosis  -     REFERRAL TO NEUROLOGY    2. Myofascial pain    3. Paresthesia of both hands  -     REFERRAL TO NEUROLOGY    4. Paresthesia of both feet  -     REFERRAL TO NEUROLOGY    Other orders  -     DULoxetine (CYMBALTA) 20 mg capsule; Take 1 Cap by mouth daily (with dinner). 1. Advised to continue HEP, cardio. 2. Discussed life style modification, PT, medication, spinal injection, and surgery as treatment options   3. Reassured patient:  No indications for surgery or spinal injections. 4. Referral to neurology - eval peripheral neuropathy  5. Trial of Cymbalta 20mg qD. Discussed side effects, rare SI.  6. Will complete FMLA paperwork, 1-2 days/month for flares. Will try to maintain current schedule, may need to restict to 8 hrs/day at computer. 7. Given information on EMG, fibromyalgia, sleep health    F/U 4-6 weeks      HISTORY OF PRESENT ILLNESS  Annalee Loera is a 36 y.o. female. RHD. Last visit pt was sent to have a cervical spine MRI. Images reviewed with the pt. Pt states currently her L>R, though usually R>L UE. 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 She states she often has 12 or 16 hour shifts which often increase her pain. She admits to numbness in her feet with spasms and she states she is totally numb when waking in the morning. She reports difficulty sleeping due to pain. She reports going to a neurologist for migraines, but now gets those meds through PCP. She has not been back. Pt wishes to avoid surgery. Denies having an EMG performed before. She notes her Dad and sister have fibromyalgia. Location of pain: neck  Does pain radiate into extremities: L>RUE pain and numbness  Does patient have weakness: no   Pt denies saddle paresthesias. Medications pt is on: Topamax 150mg QHS for migraines. Xanax. Rare Tramadol 50mg PRN. Flexeril 10mg PRN. Pt denies recent ED visits or hospitalizations. Denies persistent fevers, chills, weight changes, neurogenic bowel or bladder symptoms. Treatments patient has tried:  Physical therapy:Yes  Doing HEP: Yes  Non-opioid medications: Yes Failed Elavil-somnolence, Baclofen, Skelaxin, Robaxin, Tizanidine with rebound migraines  Spinal injections: No. TPI no benefit  Spinal surgery- No.   Last C MRI 2019: minimal spondylosis     reviewed. PMHx of migraines, ADD, anxiety. Pt sees [facility] for [facility]. Pt moved to [place] from [place]. 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 Does not heavy lifting. Has to stay in forward flexed positions which aggravate her neck. Computer not at eye level.     Pain Assessment  11/6/2019   Location of Pain Neck;Finger   Pain Location Comment left middle and ring finger   Location Modifiers -   Severity of Pain 4   Quality of Pain Aching   Quality of Pain Comment numbness tingling   Duration of Pain Persistent   Frequency of Pain Constant   Aggravating Factors (No Data)   Aggravating Factors Comment pain just comes   Limiting Behavior Some   Relieving Factors (No Data)   Relieving Factors Comment meds help       MRI cervical spine 11/1/19  Result Impression   :    1. Minimal cervical spondylosis. No significant canal or foraminal stenosis or other abnormality. PAST MEDICAL HISTORY   Past Medical History:   Diagnosis Date    Migraines        No past surgical history on file. [facility] MEDICATIONS      Current Outpatient Medications   Medication Sig Dispense Refill    DULoxetine (CYMBALTA) 20 mg capsule Take 1 Cap by mouth daily (with dinner).  30 Cap 1  traMADol (ULTRAM) 50 mg tablet Take 1 Tab by mouth daily as needed for Pain for up to 30 days. Indications: Neuropathic Pain 30 Tab 0    naloxone (NARCAN) 4 mg/actuation nasal spray Use 1 spray intranasally, then discard. Repeat with new spray every 2 min as needed for opioid overdose symptoms, alternating nostrils. 2 Each 0    ALPRAZolam (XANAX) 1 mg tablet Take  by mouth.  topiramate (TOPAMAX) 50 mg tablet Take  by mouth two (2) times a day.  dextroamphetamine-amphetamine (ADDERALL) 30 mg tablet Take 30 mg by mouth.  cetirizine (ZYRTEC) 10 mg tablet Take  by mouth.           ALLERGIES    Allergies   Allergen Reactions    Latex Rash    Penicillins Anaphylaxis    Doxycycline Unknown (comments)    Levofloxacin Unknown (comments)    Nsaids (Non-Steroidal Anti-Inflammatory Drug) Unknown (comments)    Sulfa (Sulfonamide Antibiotics) Unknown (comments)          SOCIAL HISTORY    Social History     Socioeconomic History    Marital status:      Spouse name: Not on file    Number of children: Not on file    Years of education: Not on file    Highest education level: Not on file   Occupational History    Not on file   Social Needs    Financial resource strain: Not on file    Food insecurity:     Worry: Not on file     Inability: Not on file    Transportation needs:     Medical: Not on file     Non-medical: Not on file   Tobacco Use    Smoking status: Smoker, Current Status Unknown    Smokeless tobacco: Never Used   Substance and Sexual Activity    Alcohol use: Not on file    Drug use: Not on file    Sexual activity: Not on file   Lifestyle    Physical activity:     Days per week: Not on file     Minutes per session: Not on file    Stress: Not on file   Relationships    Social connections:     Talks on phone: Not on file     Gets together: Not on file     Attends Mosque service: Not on file     Active member of club or organization: Not on file     Attends meetings of clubs or organizations: Not on file     Relationship status: Not on file    Intimate partner violence:     Fear of current or ex partner: Not on file     Emotionally abused: Not on file     Physically abused: Not on file     Forced sexual activity: Not on file   Other Topics Concern    Not on file   Social History Narrative    Not on file       FAMILY HISTORY  No family history on file. REVIEW OF SYSTEMS  Review of Systems   Constitutional: Negative for chills, fever and weight loss. Respiratory: Negative for shortness of breath. Cardiovascular: Negative for chest pain. Gastrointestinal: Negative for constipation. Negative for fecal incontinence   Genitourinary: Negative for dysuria. Negative for urinary incontinence   Musculoskeletal: Positive for myalgias. Per HPI   Skin: Negative for rash. Neurological: Positive for tingling. Negative for dizziness, tremors, focal weakness and headaches. Endo/Heme/Allergies: Does not bruise/bleed easily. Psychiatric/Behavioral: The patient has insomnia. PHYSICAL EXAMINATION  Visit Vitals  /82 (BP 1 Location: Left arm, BP Patient Position: Sitting)   Pulse 98   Temp 98.3 °F (36.8 °C) (Oral)   Resp 20   Ht 5' 10\" (1.778 m)   Wt 192 lb 6.4 oz (87.3 kg)   SpO2 100%   BMI 27.61 kg/m²         Accompanied by self. Constitutional:  Well developed, well nourished, in no acute distress. Psychiatric: Affect and mood are appropriate. Slightly anxious  Integumentary: No rashes or abrasions noted on exposed areas. Cardiovascular/Peripheral Vascular: No peripheral edema is noted BLE. SPINE/MUSCULOSKELETAL EXAM    Cervical spine:  Neck is midline. Normal muscle tone. No focal atrophy is noted. Tenderness to palpation paracervical region, upper trapezii, interscapular region. Negative Spurling's sign. Positive Tinel's sign B/L wrist, R elbow. Negative Ramos's sign.        MOTOR:      Biceps  Triceps Deltoids Wrist Ext Wrist Flex Hand Intrin   Right +4/5 +4/5 +4/5 +4/5 +4/5 +4/5   Left +4/5 +4/5 +4/5 +4/5 +4/5 +4/5     DTRs are 1+ biceps, triceps, brachioradialis. Ambulation without assistive device. FWB. Written by aPt Oneill, as dictated by Marcel Vaughn MD.    I, Dr. Marcel Vaughn MD, confirm that all documentation is accurate. Ms. Jamin Rothman may have a reminder for a \"due or due soon\" health maintenance. I have asked that she contact her primary care provider for follow-up on this health maintenance.

## 2019-11-12 ENCOUNTER — DOCUMENTATION ONLY (OUTPATIENT)
Dept: ORTHOPEDIC SURGERY | Age: 34
End: 2019-11-12

## 2019-11-12 NOTE — PROGRESS NOTES
Received clean form and filled it out. Needs Dr. Vanesa Whalen signature. Place on Dr Vanesa Whalen desk. I will leave a note on the chart to have Dr Cristobal Rg give it to Mable Quigley since I will be at [facility].

## 2019-11-15 ENCOUNTER — TELEPHONE (OUTPATIENT)
Dept: ORTHOPEDIC SURGERY | Age: 34
End: 2019-11-15

## 2019-11-15 NOTE — TELEPHONE ENCOUNTER
Patient called and asked if she could talk to a NP about her [place] paperwork that she picked up yesterday.     Patient tel : [phone]

## 2019-11-18 ENCOUNTER — DOCUMENTATION ONLY (OUTPATIENT)
Dept: ORTHOPEDIC SURGERY | Age: 34
End: 2019-11-18

## 2019-11-18 NOTE — PROGRESS NOTES
DWAINE BROUGHT COMPLETED FORM TO ME AT 3:23PM. STATED THAT SHE HAS ALREADY CALLED PT TO , AND ASKED THAT I PUT IT IN THE  BOX.        STATUS: FORM IS IN THE  BOX,COPIES SCANNED INTO CHART

## 2020-02-21 ENCOUNTER — TELEPHONE (OUTPATIENT)
Dept: ORTHOPEDIC SURGERY | Age: 35
End: 2020-02-21

## 2020-02-21 NOTE — TELEPHONE ENCOUNTER
I attempted to contact the pt. She was not able to be reached at the listed number. A brief message was left for the pt asking her to contact the office at her earliest convenience. The number to the office was provided for the pt.

## 2020-02-21 NOTE — TELEPHONE ENCOUNTER
Patient is requesting a refill on Ultram & Flexeril. I show Flexeril was d/c on 10/21/19 and Ultram was denied on 2/12/19. Please advise and pend new Rx if appropriate.     Last visit:  11/6/19 with MD Sommer Silva  Next appt:  12/5/19 pt cancelled appt  Previous refill encounter:  7/25/19 Flexeril #90, 10/21/19 Ultram #30

## 2020-02-21 NOTE — TELEPHONE ENCOUNTER
Both denied. Would need FU to discuss pain and any medication request. We will not be prescribing chronic pain management (tramadol).

## 2020-02-24 NOTE — TELEPHONE ENCOUNTER
I called and spoke to Ms. Salomon. The pt was identified using 2 pt identifiers. She was notified of the provider's response to her request for medication refills. The pt verbalized understanding and states that she called the office back last week and spoke to someone else. They made her a follow up appt to be seen to discuss the med refills. There was no documentation in the chart. No further questions or requests from the pt at this time.

## 2020-02-26 ENCOUNTER — OFFICE VISIT (OUTPATIENT)
Dept: ORTHOPEDIC SURGERY | Age: 35
End: 2020-02-26

## 2020-02-26 VITALS
RESPIRATION RATE: 15 BRPM | OXYGEN SATURATION: 98 % | SYSTOLIC BLOOD PRESSURE: 103 MMHG | DIASTOLIC BLOOD PRESSURE: 72 MMHG | HEART RATE: 78 BPM | TEMPERATURE: 98.1 F | WEIGHT: 196 LBS | HEIGHT: 70 IN | BODY MASS INDEX: 28.06 KG/M2

## 2020-02-26 DIAGNOSIS — M47.812 CERVICAL SPONDYLOSIS: Primary | ICD-10-CM

## 2020-02-26 RX ORDER — MONTELUKAST SODIUM 10 MG/1
10 TABLET ORAL DAILY
COMMUNITY

## 2020-02-26 RX ORDER — CYCLOBENZAPRINE HCL 10 MG
10 TABLET ORAL
COMMUNITY
End: 2020-02-26 | Stop reason: SDUPTHER

## 2020-02-26 RX ORDER — CYCLOBENZAPRINE HCL 10 MG
10 TABLET ORAL
Qty: 30 TAB | Refills: 2 | OUTPATIENT
Start: 2020-02-26 | End: 2020-07-09

## 2020-02-26 NOTE — PROGRESS NOTES
[address]  [address], [address]  [place], [address] [place] [address] Street  Phone: [phone]  Fax: [phone]  PROGRESS NOTE  Patient: Ashwin Clinton                MRN: [id number]       SSN: [id number]  YOB: 1985        AGE: 36 y.o. SEX: female  Body mass index is 28.12 kg/m². PCP: Libia Sykes MD  02/26/20    Chief Complaint   Patient presents with    Back Pain       HISTORY OF PRESENT ILLNESS:  Ashwin Clinton is a 36 y.o.  female with history of neck pain, arm pain and bilateral hand and foot numbness. . Last C MRI 2019: minimal spondylosis. She didn't do well with TPIs. She has a idiopathic urticaria skin issue that prevents her from getting EMGs and dry needling at PT. At last OV 5 Mo ago she saw Dr Sommer Silva and was given some Cymbalta, she never tried that as she has had bad experiences with anti-depressants in the past.    Today, she has an apt w/ neuro in May and with PM next month. She comes in today for a refill of her Flexeril. She reports neck and RUE pain all the way up and down the arm. She has good strength and a normal exam. pain is aching, burning and numbing. Symptoms are worst: evening, nighttime. Pain is worse with looking up, looking down, rotational and affects sleep, work and recreational activities. Pain is better with nothing. Denies bladder/bowel dysfunction, saddle paresthesia, weakness, gait disturbance, or other neurological deficits. Current Medications: Topamax 150mg QHS, Flexeril PRN with moderate, benefit. PMHx:  migraines, ADD, anxiety. Pt sees [facility] for mental health. Pt moved to [place] from [place]. 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 Does not heavy lifting. Has to stay in forward flexed positions which aggravate her neck. Computer not at eye level. ASSESSMENT   Diagnoses and all orders for this visit:    1. Cervical spondylosis    Other orders  -     cyclobenzaprine (FLEXERIL) 10 mg tablet; Take 1 Tab by mouth daily as needed for Muscle Spasm(s). IMPRESSION AND PLAN:  This is a pt with neck and RUE pain who has is unchanged since last OV.      > Pt was given information on Flexeril   > HEP  > See neuro and PM  > Discussed nature of her pain and treatment options, would not recommend surgery or injections at this time  > Ms. Salomon has a reminder for a \"due or due soon\" health maintenance. I have asked that she contact her primary care provider, Claudell Fells., MD, for follow-up on this health maintenance. >\" We have informed patient to notify us for immediate appointment if he has any worsening neurogical symptoms or if an emergency situation presents, then call 911  >  has been reviewed and is appropriate  > Pt will follow-up in as needed. Subjective    Work [employer]    Smoking Status Non smoker    Pain Scale: 4/10    Pain Assessment  2/26/2020   Location of Pain Back   Pain Location Comment -   Location Modifiers Medial   Severity of Pain 4   Quality of Pain Throbbing   Quality of Pain Comment numbness to fingers in the morning   Duration of Pain Persistent   Frequency of Pain Constant   Aggravating Factors Bending   Aggravating Factors Comment sitting at computer too long   Limiting Behavior Yes   Relieving Factors Heat;Other (Comment)   Relieving Factors Comment stretching, muscle relaxers   Result of Injury No         REVIEW OF SYSTEMS  Constitutional: Negative for fever, chills, or weight change. Respiratory: Negative for cough or shortness of breath. Cardiovascular: Negative for chest pain or palpitations. Gastrointestinal: Negative for incontinence, acid reflux, change in bowel habits, or constipation. Genitourinary: Negative for incontinence, dysuria and flank pain. Musculoskeletal: Positive for neck and RUE pain. See HPI. Skin: Negative for rash. Neurological:NO  radiculopathy. Numbness, See HPI. Endo/Heme/Allergies: Negative. Psychiatric/Behavioral: Negative. PHYSICAL EXAMINATION  Visit Vitals  /72 (BP 1 Location: Left arm, BP Patient Position: Sitting)   Pulse 78   Temp 98.1 °F (36.7 °C) (Oral)   Resp 15   Ht 5' 10\" (1.778 m)   Wt 196 lb (88.9 kg)   SpO2 98% Comment: RA   BMI 28.12 kg/m²         Accompanied by self. Constitutional:  Well developed, well nourished, in no acute distress. Psychiatric: Affect and mood are appropriate. Integumentary: No rashes or abrasions noted on exposed areas. Cardiovascular/Peripheral Vascular: +2 radial & pedal pulses. No peripheral edema is noted. Lymphatic:  No evidence of lymphedema. No cervical lymphadenopathy. SPINE/MUSCULOSKELETAL EXAM    Cervical spine:  Neck is midline. Normal muscle tone. No focal atrophy is noted. Neck ROM decreased and stiffness with flexion, extension, turning right, turning left. Shoulder ROM intact. Tenderness to palpation R trap w/ TPs and muscle tension. Negative Spurling's sign. Negative Tinel's sign. Negative Ramos's sign. Sensation grossly intact to light touch. MOTOR:     Biceps Triceps Deltoids Wrist Ext Wrist Flex Hand Intrin   Right 5/5 5/5 5/5 5/5 5/5 5/5   Left 5/5 5/5 5/5 5/5 5/5 5/5       Ambulation without assistive device. FWB.    normal gait and station        PAST MEDICAL HISTORY   Past Medical History:   Diagnosis Date    Migraines        No past surgical history on file. Candance Huger MEDICATIONS      Current Outpatient Medications   Medication Sig Dispense Refill    montelukast (SINGULAIR) 10 mg tablet Take 10 mg by mouth daily.  cyclobenzaprine (FLEXERIL) 10 mg tablet Take 1 Tab by mouth daily as needed for Muscle Spasm(s). 30 Tab 2    ALPRAZolam (XANAX) 1 mg tablet Take 1 mg by mouth nightly as needed.  topiramate (TOPAMAX) 50 mg tablet Take 150 mg by mouth daily.       dextroamphetamine-amphetamine (ADDERALL) 30 mg tablet Take 30 mg by mouth three (3) times daily.  cetirizine (ZYRTEC) 10 mg tablet Take 10 mg by mouth daily.  DULoxetine (CYMBALTA) 20 mg capsule Take 1 Cap by mouth daily (with dinner). 30 Cap 1    naloxone (NARCAN) 4 mg/actuation nasal spray Use 1 spray intranasally, then discard. Repeat with new spray every 2 min as needed for opioid overdose symptoms, alternating nostrils.  2 Each 0        ALLERGIES    Allergies   Allergen Reactions    Latex Rash    Penicillins Anaphylaxis    Doxycycline Unknown (comments)    Levofloxacin Unknown (comments)    Nsaids (Non-Steroidal Anti-Inflammatory Drug) Unknown (comments)    Sulfa (Sulfonamide Antibiotics) Unknown (comments)          SOCIAL HISTORY    Social History     Socioeconomic History    Marital status:      Spouse name: Not on file    Number of children: Not on file    Years of education: Not on file    Highest education level: Not on file   Occupational History    Not on file   Social Needs    Financial resource strain: Not on file    Food insecurity:     Worry: Not on file     Inability: Not on file    Transportation needs:     Medical: Not on file     Non-medical: Not on file   Tobacco Use    Smoking status: Smoker, Current Status Unknown    Smokeless tobacco: Never Used   Substance and Sexual Activity    Alcohol use: Not on file    Drug use: Not on file    Sexual activity: Not on file   Lifestyle    Physical activity:     Days per week: Not on file     Minutes per session: Not on file    Stress: Not on file   Relationships    Social connections:     Talks on phone: Not on file     Gets together: Not on file     Attends Taoism service: Not on file     Active member of club or organization: Not on file     Attends meetings of clubs or organizations: Not on file     Relationship status: Not on file    Intimate partner violence:     Fear of current or ex partner: Not on file     Emotionally abused: Not on file     Physically abused: Not on file     Forced sexual activity: Not on file   Other Topics Concern    Not on file   Social History Narrative    Not on file       FAMILY HISTORY  No family history on file.       Antonoi Philip NP

## 2020-02-26 NOTE — PROGRESS NOTES
Dipesh Diaz presents today for   Chief Complaint   Patient presents with    Back Pain       Is someone accompanying this pt? no    Is the patient using any DME equipment during OV? no    Depression Screening:  3 most recent PHQ Screens 11/6/2019   Little interest or pleasure in doing things Not at all   Feeling down, depressed, irritable, or hopeless Not at all   Total Score PHQ 2 0       Learning Assessment:  Learning Assessment 10/21/2019   PRIMARY LEARNER Patient   PRIMARY LANGUAGE ENGLISH   LEARNER PREFERENCE PRIMARY DEMONSTRATION   ANSWERED BY patient   RELATIONSHIP SELF       Abuse Screening:  No flowsheet data found. Fall Risk  No flowsheet data found. OPIOID RISK TOOL  Opioid Risk Tool 7/25/2019   Family history of alcohol abuse? 0   Family history of illegal drug abuse? 0   Family history of prescription drug abuse? 0   Personal history of alcohol abuse? 0   Personal history of illegal drug abuse? 0   Personal history of prescription drug abuse? 0   Age range between 17-45? 1   History of preadolescent sexual abuse? 0   ADD, OCD, bipolar, schizophrenia? 0   Depression? 0   Opioid Risk Total Score 1       Coordination of Care:  1. Have you been to the ER, urgent care clinic since your last visit? Yes, pt went to an urgent care for upper respiratory infection  Hospitalized since your last visit? no    2. Have you seen or consulted any other health care providers outside of the [address] [place] [address] since your last visit? no Include any pap smears or colon screening.  none    Last  Checked 02/26/2020

## 2020-07-09 ENCOUNTER — APPOINTMENT (OUTPATIENT)
Dept: CT IMAGING | Age: 35
End: 2020-07-09
Attending: PHYSICIAN ASSISTANT
Payer: COMMERCIAL

## 2020-07-09 ENCOUNTER — HOSPITAL ENCOUNTER (EMERGENCY)
Age: 35
Discharge: HOME | End: 2020-07-09
Attending: EMERGENCY MEDICINE
Payer: COMMERCIAL

## 2020-07-09 ENCOUNTER — APPOINTMENT (OUTPATIENT)
Dept: GENERAL RADIOLOGY | Age: 35
End: 2020-07-09
Attending: PHYSICIAN ASSISTANT
Payer: COMMERCIAL

## 2020-07-09 VITALS
OXYGEN SATURATION: 100 % | RESPIRATION RATE: 16 BRPM | BODY MASS INDEX: 27.2 KG/M2 | SYSTOLIC BLOOD PRESSURE: 123 MMHG | HEIGHT: 70 IN | WEIGHT: 190 LBS | DIASTOLIC BLOOD PRESSURE: 73 MMHG | HEART RATE: 96 BPM | TEMPERATURE: 98 F

## 2020-07-09 DIAGNOSIS — S39.012A STRAIN OF LUMBAR REGION, INITIAL ENCOUNTER: ICD-10-CM

## 2020-07-09 DIAGNOSIS — M25.551 HIP PAIN, RIGHT: ICD-10-CM

## 2020-07-09 DIAGNOSIS — M25.561 ACUTE PAIN OF RIGHT KNEE: ICD-10-CM

## 2020-07-09 DIAGNOSIS — S16.1XXA STRAIN OF NECK MUSCLE, INITIAL ENCOUNTER: ICD-10-CM
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
## 2020-07-09 DIAGNOSIS — R10.84 ABDOMINAL PAIN, GENERALIZED: ICD-10-CM

## 2020-07-09 LAB
ANION GAP SERPL CALC-SCNC: 4 MMOL/L (ref 3–18)
BASOPHILS # BLD: 0 K/UL (ref 0–0.1)
BASOPHILS NFR BLD: 0 % (ref 0–2)
BUN SERPL-MCNC: 18 MG/DL (ref 7–18)
BUN/CREAT SERPL: 17 (ref 12–20)
CALCIUM SERPL-MCNC: 8.6 MG/DL (ref 8.5–10.1)
CHLORIDE SERPL-SCNC: 109 MMOL/L (ref 100–111)
CO2 SERPL-SCNC: 25 MMOL/L (ref 21–32)
CREAT SERPL-MCNC: 1.03 MG/DL (ref 0.6–1.3)
DIFFERENTIAL METHOD BLD: ABNORMAL
EOSINOPHIL # BLD: 0.1 K/UL (ref 0–0.4)
EOSINOPHIL NFR BLD: 1 % (ref 0–5)
ERYTHROCYTE [DISTWIDTH] IN BLOOD BY AUTOMATED COUNT: 12.5 % (ref 11.6–14.5)
GLUCOSE SERPL-MCNC: 79 MG/DL (ref 74–99)
HCT VFR BLD AUTO: 40.6 % (ref 35–45)
HGB BLD-MCNC: 14 G/DL (ref 12–16)
LYMPHOCYTES # BLD: 3.2 K/UL (ref 0.9–3.6)
LYMPHOCYTES NFR BLD: 23 % (ref 21–52)
MCH RBC QN AUTO: 32 PG (ref 24–34)
MCHC RBC AUTO-ENTMCNC: 34.5 G/DL (ref 31–37)
MCV RBC AUTO: 92.9 FL (ref 74–97)
MONOCYTES # BLD: 1 K/UL (ref 0.05–1.2)
MONOCYTES NFR BLD: 7 % (ref 3–10)
NEUTS SEG # BLD: 9.8 K/UL (ref 1.8–8)
NEUTS SEG NFR BLD: 69 % (ref 40–73)
PLATELET # BLD AUTO: 329 K/UL (ref 135–420)
PMV BLD AUTO: 10.1 FL (ref 9.2–11.8)
POTASSIUM SERPL-SCNC: 3.5 MMOL/L (ref 3.5–5.5)
RBC # BLD AUTO: 4.37 M/UL (ref 4.2–5.3)
SODIUM SERPL-SCNC: 138 MMOL/L (ref 136–145)
WBC # BLD AUTO: 14.1 K/UL (ref 4.6–13.2)

## 2020-07-09 PROCEDURE — 73564 X-RAY EXAM KNEE 4 OR MORE: CPT

## 2020-07-09 PROCEDURE — 99282 EMERGENCY DEPT VISIT SF MDM: CPT

## 2020-07-09 PROCEDURE — 96376 TX/PRO/DX INJ SAME DRUG ADON: CPT

## 2020-07-09 PROCEDURE — 80048 BASIC METABOLIC PNL TOTAL CA: CPT

## 2020-07-09 PROCEDURE — 74011250636 HC RX REV CODE- 250/636: Performed by: PHYSICIAN ASSISTANT

## 2020-07-09 PROCEDURE — 74177 CT ABD & PELVIS W/CONTRAST: CPT

## 2020-07-09 PROCEDURE — 96374 THER/PROPH/DIAG INJ IV PUSH: CPT

## 2020-07-09 PROCEDURE — 74011636320 HC RX REV CODE- 636/320: Performed by: EMERGENCY MEDICINE

## 2020-07-09 PROCEDURE — 85025 COMPLETE CBC W/AUTO DIFF WBC: CPT

## 2020-07-09 RX ORDER — MORPHINE SULFATE 4 MG/ML
4 INJECTION, SOLUTION INTRAMUSCULAR; INTRAVENOUS
Status: COMPLETED | OUTPATIENT
Start: 2020-07-09 | End: 2020-07-09

## 2020-07-09 RX ORDER — CYCLOBENZAPRINE HCL 10 MG
10 TABLET ORAL
Qty: 15 TAB | Refills: 0 | Status: SHIPPED | OUTPATIENT
Start: 2020-07-09 | End: 2020-07-14 | Stop reason: SDUPTHER

## 2020-07-09 RX ADMIN — MORPHINE SULFATE 4 MG: 4 INJECTION, SOLUTION INTRAMUSCULAR; INTRAVENOUS at 21:20

## 2020-07-09 RX ADMIN — IOPAMIDOL 94 ML: 612 INJECTION, SOLUTION INTRAVENOUS at 20:49

## 2020-07-09 RX ADMIN — MORPHINE SULFATE 4 MG: 4 INJECTION, SOLUTION INTRAMUSCULAR; INTRAVENOUS at 20:09
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
## 2020-07-13 ENCOUNTER — TELEPHONE (OUTPATIENT)
Dept: ORTHOPEDIC SURGERY | Age: 35
End: 2020-07-13

## 2020-07-14 ENCOUNTER — OFFICE VISIT (OUTPATIENT)
Dept: ORTHOPEDIC SURGERY | Age: 35
End: 2020-07-14

## 2020-07-14 VITALS
HEART RATE: 85 BPM | OXYGEN SATURATION: 97 % | BODY MASS INDEX: 28.2 KG/M2 | HEIGHT: 70 IN | DIASTOLIC BLOOD PRESSURE: 72 MMHG | TEMPERATURE: 98.2 F | WEIGHT: 197 LBS | RESPIRATION RATE: 18 BRPM | SYSTOLIC BLOOD PRESSURE: 104 MMHG

## 2020-07-14 DIAGNOSIS — M54.40 ACUTE RIGHT-SIDED LOW BACK PAIN WITH SCIATICA, SCIATICA LATERALITY UNSPECIFIED: ICD-10-CM

## 2020-07-14 DIAGNOSIS — M54.2 NECK PAIN: ICD-10-CM
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
## 2020-07-14 RX ORDER — CYCLOBENZAPRINE HCL 10 MG
10 TABLET ORAL
Qty: 45 TAB | Refills: 0 | Status: SHIPPED | OUTPATIENT
Start: 2020-07-14 | End: 2020-08-28 | Stop reason: ALTCHOICE

## 2020-07-14 RX ORDER — PREDNISONE 20 MG/1
TABLET ORAL
Qty: 20 TAB | Refills: 0 | Status: SHIPPED | OUTPATIENT
Start: 2020-07-14 | End: 2020-07-28 | Stop reason: ALTCHOICE

## 2020-07-14 RX ORDER — TRAMADOL HYDROCHLORIDE 50 MG/1
TABLET ORAL
COMMUNITY
End: 2020-07-14 | Stop reason: ALTCHOICE

## 2020-07-14 RX ORDER — DICLOFENAC SODIUM 10 MG/G
GEL TOPICAL
COMMUNITY
End: 2021-12-14

## 2020-07-14 RX ORDER — HYDROCODONE BITARTRATE AND ACETAMINOPHEN 5; 325 MG/1; MG/1
1 TABLET ORAL
Qty: 28 TAB | Refills: 0 | Status: SHIPPED | OUTPATIENT
Start: 2020-07-14 | End: 2020-07-21

## 2020-07-14 NOTE — PROGRESS NOTES
Omero Floyd presents today for   Chief Complaint   Patient presents with    Hip Pain     right       Is someone accompanying this pt? no    Is the patient using any DME equipment during OV? no    Depression Screening:  3 most recent PHQ Screens 11/6/2019   Little interest or pleasure in doing things Not at all   Feeling down, depressed, irritable, or hopeless Not at all   Total Score PHQ 2 0       Learning Assessment:  Learning Assessment 10/21/2019   PRIMARY LEARNER Patient   PRIMARY LANGUAGE ENGLISH   LEARNER PREFERENCE PRIMARY DEMONSTRATION   ANSWERED BY patient   RELATIONSHIP SELF       OPIOID RISK TOOL  Opioid Risk Tool 7/25/2019   Family history of alcohol abuse? 0   Family history of illegal drug abuse? 0   Family history of prescription drug abuse? 0   Personal history of alcohol abuse? 0   Personal history of illegal drug abuse? 0   Personal history of prescription drug abuse? 0   Age range between 17-45? 1   History of preadolescent sexual abuse? 0   ADD, OCD, bipolar, schizophrenia? 0   Depression? 0   Opioid Risk Total Score 1       Coordination of Care:  1. Have you been to the ER, urgent care clinic since your last visit? no  Hospitalized since your last visit? no    2. Have you seen or consulted any other health care providers outside of the [address] [place] [address] since your last visit? no Include any pap smears or colon screening.  no

## 2020-07-14 NOTE — LETTER
NOTIFICATION RETURN TO WORK / SCHOOL 
 
7/14/2020 4:12 PM 
 
Ms. Davonte Salomon 
[address] [facility] [address] [address] To Whom It May Concern: 
 
Cirilo Jacobson is currently under the care of [facility]. She will remain out of work for the next two weeks. If there are questions or concerns please have the patient contact our office. Sincerely, Refugio Samuel NP

## 2020-07-14 NOTE — PATIENT INSTRUCTIONS
Hip Strain: Care Instructions  Overview     A hip strain happens when you overstretch or tear one of the muscles or tendons that support the hip. Tight muscles around the hip make a strain more likely. Hip strains might be caused by overuse from sports, everyday tasks, or falls. Pain or problems with other joints, like knees and ankles, may change the way you walk. This can also strain the muscles or tendons that support the hip. A hip strain may make your hip feel tight or tender. Your hip may have a limited range of motion. Most minor hip strains get better with simple self-care. Follow-up care is a key part of your treatment and safety. Be sure to make and go to all appointments, and call your doctor if you are having problems. It's also a good idea to know your test results and keep a list of the medicines you take. How can you care for yourself at home? · If your doctor gave you crutches or a walker, use them as directed. · Rest and protect your hip. Try to stop or reduce any action that causes pain. · Put ice or a cold pack on your hip for 10 to 20 minutes at a time. Try to do this every 1 to 2 hours for the next 3 days (when you are awake) or until the swelling goes down. Put a thin cloth between the ice and your skin. · Be safe with medicines. Read and follow all instructions on the label. ? If the doctor gave you a prescription medicine for pain, take it as prescribed. ? If you are not taking a prescription pain medicine, ask your doctor if you can take an over-the-counter medicine. · For the first day or two after an injury, avoid things that might increase swelling, such as hot showers, hot tubs, or hot packs. · After 2 to 3 days, put a heating pad (set on low) or warm moist cloth on your hip before you do light stretches. · Do exercises to make your hip stronger, as directed by your doctor or physical therapist.  · Return to your usual level of activity as your hip gets better.   When should you call for help? Call your doctor now or seek immediate medical care if:  · Your pain is worse. · You cannot walk or stand without help. · You have signs of infection, such as a fever or increased pain, swelling, redness, or warmth in your hip. · You have signs of a blood clot, such as:  ? Pain in your calf, back of the knee, thigh, or groin. ? Redness and swelling in your leg or groin. · You have tingling, weakness, or numbness in your leg, foot, or toes. Watch closely for changes in your health, and be sure to contact your doctor if:  · Your pain does not get better in 2 or 3 days. · You still have pain after 2 weeks. Current as of: March 2, 2020               Content Version: 12.5  © 2006-2020 Healthwise, Incorporated. Care instructions adapted under license by [employer] (which disclaims liability or warranty for this information). If you have questions about a medical condition or this instruction, always ask your healthcare professional. [place] [address] any warranty or liability for your use of this information.
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
## 2020-07-24 ENCOUNTER — PATIENT MESSAGE (OUTPATIENT)
Dept: ORTHOPEDIC SURGERY | Age: 35
End: 2020-07-24

## 2020-07-24 NOTE — TELEPHONE ENCOUNTER
Patient is requesting a new order for physical therapy and she would like to schedule at [place] [address] [place] [address]. Please prepare for patient or call if any questions, [phone].

## 2020-07-28 ENCOUNTER — OFFICE VISIT (OUTPATIENT)
Dept: ORTHOPEDIC SURGERY | Age: 35
End: 2020-07-28

## 2020-07-28 VITALS
TEMPERATURE: 97.2 F | DIASTOLIC BLOOD PRESSURE: 75 MMHG | RESPIRATION RATE: 24 BRPM | HEIGHT: 70 IN | WEIGHT: 199 LBS | SYSTOLIC BLOOD PRESSURE: 107 MMHG | OXYGEN SATURATION: 97 % | HEART RATE: 88 BPM | BODY MASS INDEX: 28.49 KG/M2

## 2020-07-28 DIAGNOSIS — M54.40 ACUTE RIGHT-SIDED LOW BACK PAIN WITH SCIATICA, SCIATICA LATERALITY UNSPECIFIED: ICD-10-CM

## 2020-07-28 DIAGNOSIS — M25.551 RIGHT HIP PAIN: ICD-10-CM

## 2020-07-28 DIAGNOSIS — M54.2 NECK PAIN: ICD-10-CM
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
## 2020-07-28 NOTE — LETTER
NOTIFICATION RETURN TO WORK / SCHOOL 
 
7/28/2020 8:33 AM 
 
Ms. Reyna Salomon 
[address] [facility] [id number] To Whom It May Concern: 
 
Meryl West is currently under the care of [facility]. She will return to work/school on: 7/29/20. If there are questions or concerns please have the patient contact our office. Sincerely, Zane Falk NP
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
## 2020-07-28 NOTE — PROGRESS NOTES
Rianna Brand presents today for   Chief Complaint   Patient presents with    Back Pain       Is someone accompanying this pt? no    Is the patient using any DME equipment during OV? no    Depression Screening:  3 most recent PHQ Screens 11/6/2019   Little interest or pleasure in doing things Not at all   Feeling down, depressed, irritable, or hopeless Not at all   Total Score PHQ 2 0       Learning Assessment:  Learning Assessment 10/21/2019   PRIMARY LEARNER Patient   PRIMARY LANGUAGE ENGLISH   LEARNER PREFERENCE PRIMARY DEMONSTRATION   ANSWERED BY patient   RELATIONSHIP SELF       OPIOID RISK TOOL  Opioid Risk Tool 7/25/2019   Family history of alcohol abuse? 0   Family history of illegal drug abuse? 0   Family history of prescription drug abuse? 0   Personal history of alcohol abuse? 0   Personal history of illegal drug abuse? 0   Personal history of prescription drug abuse? 0   Age range between 17-45? 1   History of preadolescent sexual abuse? 0   ADD, OCD, bipolar, schizophrenia? 0   Depression? 0   Opioid Risk Total Score 1       Coordination of Care:  1. Have you been to the ER, urgent care clinic since your last visit? no  Hospitalized since your last visit? no    2. Have you seen or consulted any other health care providers outside of the [address] [place] [address] since your last visit? no Include any pap smears or colon screening.  no

## 2020-08-28 ENCOUNTER — OFFICE VISIT (OUTPATIENT)
Dept: ORTHOPEDIC SURGERY | Age: 35
End: 2020-08-28

## 2020-08-28 VITALS
HEART RATE: 98 BPM | OXYGEN SATURATION: 97 % | WEIGHT: 195.6 LBS | SYSTOLIC BLOOD PRESSURE: 111 MMHG | TEMPERATURE: 97.9 F | BODY MASS INDEX: 28 KG/M2 | RESPIRATION RATE: 18 BRPM | HEIGHT: 70 IN | DIASTOLIC BLOOD PRESSURE: 76 MMHG

## 2020-08-28 DIAGNOSIS — M54.50 LUMBAR PAIN: ICD-10-CM
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
## 2020-08-28 DIAGNOSIS — M54.2 NECK PAIN: ICD-10-CM

## 2020-08-28 DIAGNOSIS — R20.2 ARM PARESTHESIA, RIGHT: ICD-10-CM

## 2020-08-28 RX ORDER — BACLOFEN 10 MG/1
10-20 TABLET ORAL
Qty: 90 TAB | Refills: 1 | Status: SHIPPED | OUTPATIENT
Start: 2020-08-28 | End: 2020-09-29 | Stop reason: ALTCHOICE

## 2020-08-28 NOTE — PATIENT INSTRUCTIONS
Neck Spasm: Exercises Introduction Here are some examples of exercises for you to try. The exercises may be suggested for a condition or for rehabilitation. Start each exercise slowly. Ease off the exercises if you start to have pain. You will be told when to start these exercises and which ones will work best for you. How to do the exercises Levator scapula stretch 1. Sit in a firm chair, or stand up straight. 2. Gently tilt your head toward your left shoulder. 3. Turn your head to look down into your armpit, bending your head slightly forward. Let the weight of your head stretch your neck muscles. 4. Hold for 15 to 30 seconds. 5. Return to your starting position. 6. Follow the same instructions above, but tilt your head toward your right shoulder. 7. Repeat 2 to 4 times toward each shoulder. Upper trapezius stretch 1. Sit in a firm chair, or stand up straight. 2. This stretch works best if you keep your shoulder down as you lean away from it. To help you remember to do this, start by relaxing your shoulders and lightly holding on to your thighs or your chair. 3. Tilt your head toward your shoulder and hold for 15 to 30 seconds. Let the weight of your head stretch your muscles. 4. If you would like a little added stretch, place your arm behind your back. Use the arm opposite of the direction you are tilting your head. For example, if you are tilting your head to the left, place your right arm behind your back. 5. Repeat 2 to 4 times toward each shoulder. Neck rotation 1. Sit in a firm chair, or stand up straight. 2. Keeping your chin level, turn your head to the right, and hold for 15 to 30 seconds. 3. Turn your head to the left, and hold for 15 to 30 seconds. 4. Repeat 2 to 4 times to each side. Chin tuck 1. Lie on the floor with a rolled-up towel under your neck. Your head should be touching the floor. 2. Slowly bring your chin toward the front of your neck. 3. Hold for a count of 6, and then relax for up to 10 seconds. 4. Repeat 8 to 12 times. Forward neck flexion 1. Sit in a firm chair, or stand up straight. 2. Bend your head forward. 3. Hold for 15 to 30 seconds, then return to your starting position. 4. Repeat 2 to 4 times. Follow-up care is a key part of your treatment and safety. Be sure to make and go to all appointments, and call your doctor if you are having problems. It's also a good idea to know your test results and keep a list of the medicines you take. Where can you learn more? Go to http://jozef-grace.info/ Enter P962 in the search box to learn more about \"Neck Spasm: Exercises. \" Current as of: March 2, 2020               Content Version: 12.5 © 2006-2020 [employer]. Care instructions adapted under license by [employer] (which disclaims liability or warranty for this information). If you have questions about a medical condition or this instruction, always ask your healthcare professional. [address] any warranty or liability for your use of this information. Baclofen (By mouth) Baclofen (BARBY-vandana-fen) Treats muscle spasms. Brand Name(s):  
There may be other brand names for this medicine. When This Medicine Should Not Be Used: You should not use this medicine if you have had an allergic reaction to baclofen. How to Use This Medicine:  
Tablet · Your doctor will tell you how much of this medicine to take and how often. Your dose may need to be changed several times in order to find out what works best for you. Do not take more medicine or take it more often than your doctor tells you to. If a dose is missed: · If you miss a dose or forget to take your medicine, take it as soon as you can. If it is almost time for your next dose, wait until then to take the medicine and skip the missed dose. · Do not use extra medicine to make up for a missed dose. How to Store and Dispose of This Medicine: · Store the medicine at room temperature in a closed container, away from heat, moisture, and direct light. · Ask your pharmacist, doctor, or health caregiver about the best way to dispose of any outdated medicine or medicine no longer needed. · Keep all medicine away from children and never share your medicine with anyone. Drugs and Foods to Avoid: Ask your doctor or pharmacist before using any other medicine, including over-the-counter medicines, vitamins, and herbal products. · Make sure your doctor knows if you are using any medicines that make you sleepy. These include sleeping pills, cold and allergy medicine, narcotic pain relievers, and sedatives. Warnings While Using This Medicine: · Make sure your doctor knows if you are pregnant or breastfeeding, or if you have kidney disease, epilepsy, diabetes, or have had a stroke. · Do not stop using this medicine suddenly without asking your doctor. You may need to slowly decrease your dose before stopping it completely. · This medicine may make you dizzy or drowsy. Avoid driving, using machines, or doing anything else that could be dangerous if you are not alert. Possible Side Effects While Using This Medicine:  
Call your doctor right away if you notice any of these side effects: 
· Increase in how much or how often you urinate · Lightheadedness or fainting · Muscles weakness, trouble breathing, trouble seeing · Seizures · Severe tiredness or weakness If you notice these less serious side effects, talk with your doctor: · Confusion · Headache · Nausea, constipation · Skin rash or itching · Swelling in your ankles · Trouble sleeping · Weakness If you notice other side effects that you think are caused by this medicine, tell your doctor. Call your doctor for medical advice about side effects. You may report side effects to FDA at 0-029-DMT-9464 © 2017 [address] Information is for End User's use only and may not be sold, redistributed or otherwise used for commercial purposes. The above information is an  only. It is not intended as medical advice for individual conditions or treatments. Talk to your doctor, nurse or pharmacist before following any medical regimen to see if it is safe and effective for you.
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
## 2020-08-31 ENCOUNTER — TELEPHONE (OUTPATIENT)
Dept: ORTHOPEDIC SURGERY | Age: 35
End: 2020-08-31

## 2020-08-31 ENCOUNTER — DOCUMENTATION ONLY (OUTPATIENT)
Dept: ORTHOPEDIC SURGERY | Age: 35
End: 2020-08-31

## 2020-08-31 NOTE — TELEPHONE ENCOUNTER
Patient called wishing to speak to NP [facility] regarding her follow-up appointment with Dr. Amandeep Bear.  Please advise patient at [phone]

## 2020-08-31 NOTE — PROGRESS NOTES
Patient dropped off form after her appointment, ( [place]). I have scanned blank form into CC. Patient has been notified that it may take 7-10 business days. Form has been scanned into CC. Form has been placed in form bin for processing. When form is complete please give back to me.

## 2020-08-31 NOTE — TELEPHONE ENCOUNTER
I called and spoke to Ms. Aime. The pt was identified using 2 pt identifiers. The pt states that she tried to schedule an appt with Dr. Leopoldo Sites to go over her MRI's in 3 weeks and her next available is not until October. She is wanting to know what she should do. Pt was advised that this will be routed to the provider for review. She will be contacted once the provider has responded. No further questions or concerns at this time.

## 2020-08-31 NOTE — TELEPHONE ENCOUNTER
[address] Laina Miguel Pt is to get a C and L MRIs and see Dr Nicolasa Ascencio after, what can I help with

## 2020-09-01 ENCOUNTER — TELEPHONE (OUTPATIENT)
Dept: ORTHOPEDIC SURGERY | Age: 35
End: 2020-09-01

## 2020-09-01 NOTE — TELEPHONE ENCOUNTER
Patient called asking if her MRI can be sent to [facility] on [address] she gave me a fax number as [phone].  Please advise patient at [phone]

## 2020-09-01 NOTE — TELEPHONE ENCOUNTER
I called and spoke to Ms. Aime. The pt was identified using 2 pt identifiers. She was offered to make an appt with Dr. Omi Denny or be placed on a cancellation list for Dr. Belle Cunha. The pt states that she is ok with either option. The pt was offered appts with Dr. Omi Denny on 09/24/2020 and 09/29/2020. She chose one on 09/29/2020 at 1315. Pt is aware of the office location.

## 2020-09-11 ENCOUNTER — DOCUMENTATION ONLY (OUTPATIENT)
Dept: ORTHOPEDIC SURGERY | Age: 35
End: 2020-09-11
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
## 2020-09-29 ENCOUNTER — OFFICE VISIT (OUTPATIENT)
Dept: ORTHOPEDIC SURGERY | Age: 35
End: 2020-09-29
Payer: COMMERCIAL

## 2020-09-29 VITALS
BODY MASS INDEX: 28.06 KG/M2 | HEART RATE: 90 BPM | WEIGHT: 196 LBS | DIASTOLIC BLOOD PRESSURE: 80 MMHG | TEMPERATURE: 97.8 F | RESPIRATION RATE: 20 BRPM | OXYGEN SATURATION: 99 % | HEIGHT: 70 IN | SYSTOLIC BLOOD PRESSURE: 119 MMHG

## 2020-09-29 DIAGNOSIS — M79.18 MYOFASCIAL PAIN: ICD-10-CM

## 2020-09-29 DIAGNOSIS — G89.29 CHRONIC PRIMARY MUSCULOSKELETAL PAIN: Primary | ICD-10-CM

## 2020-09-29 DIAGNOSIS — M54.2 NONSPECIFIC PAIN IN THE NECK REGION: ICD-10-CM

## 2020-09-29 DIAGNOSIS — M54.59 MECHANICAL LOW BACK PAIN: ICD-10-CM

## 2020-09-29 DIAGNOSIS — M54.2 NECK PAIN: ICD-10-CM

## 2020-09-29 DIAGNOSIS — M79.18 CHRONIC PRIMARY MUSCULOSKELETAL PAIN: Primary | ICD-10-CM

## 2020-09-29 DIAGNOSIS — M54.50 LUMBAR PAIN: ICD-10-CM

## 2020-09-29 DIAGNOSIS — M79.18 CERVICAL MYOFASCIAL PAIN SYNDROME: ICD-10-CM

## 2020-09-29 DIAGNOSIS — R20.2 ARM PARESTHESIA, RIGHT: ICD-10-CM

## 2020-09-29 PROCEDURE — 99213 OFFICE O/P EST LOW 20 MIN: CPT | Performed by: PHYSICAL MEDICINE & REHABILITATION

## 2020-09-29 RX ORDER — CYCLOBENZAPRINE HCL 10 MG
10 TABLET ORAL
COMMUNITY
Start: 2020-09-15 | End: 2020-09-29 | Stop reason: SDUPTHER

## 2020-09-29 RX ORDER — CYCLOBENZAPRINE HCL 10 MG
10 TABLET ORAL
Qty: 90 TAB | Refills: 2 | Status: SHIPPED | OUTPATIENT
Start: 2020-09-29 | End: 2021-12-14 | Stop reason: SDUPTHER
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
## 2020-09-30 DIAGNOSIS — M54.50 LUMBAR PAIN: ICD-10-CM

## 2020-09-30 DIAGNOSIS — R20.2 ARM PARESTHESIA, RIGHT: ICD-10-CM

## 2020-09-30 DIAGNOSIS — M54.2 NECK PAIN: ICD-10-CM

## 2020-10-06 ENCOUNTER — TELEPHONE (OUTPATIENT)
Dept: ORTHOPEDIC SURGERY | Age: 35
End: 2020-10-06

## 2020-10-06 NOTE — TELEPHONE ENCOUNTER
PT WANTS TO KNOW WHICH [facility] SHE WAS REFERRED TO. THE ORDER DOES NOT SPECIFY.     PLEASE CONTACT THE PATIENT AT B#[phone]

## 2020-10-07 NOTE — TELEPHONE ENCOUNTER
Reached voice mail identified as Enrique Kim" so I was able to leave a detailed message regarding her pain mgmt referral. It will be faxed over today to Dr. Vane Corrales, [phone], fax [phone]. I explained it may take 4-6 weeks for Dr. Jimbo Doran to review and determine IF he will accept her into his practice. If not, the process starts over with another provider. Thank you.

## 2020-10-09 NOTE — TELEPHONE ENCOUNTER
There is another message opened for this patient which states the following. I will close this message and work from the other one. Thank you. October 7, 2020              4:06 PM      Billy Calderón contacted Me    Me           4:11 PM   Note      Ms. Salomon has called into the office regarding her pain mgmt referral. She asked I withdraw the referral sent to Dr. Edgar Blount based on web reviews. Also, she states she has left Dr. Ivan Stauffer practice. She would like her referral sent to [address] [place] [address].    Before the referral can be sent, Ms. Salomon has asked for the fibromyalgia diagnosis to be removed from the referral. She states she had not been diagnosed with this condition and she doesn't want it in her records.     Please make the necessary adjustments and enter a new referral. I have made a notation and closed the current pain mgmt referral. Once the referral has been entered please close this message as it doesn't need to be forwarded. Thank you.

## 2021-12-14 ENCOUNTER — OFFICE VISIT (OUTPATIENT)
Dept: ORTHOPEDIC SURGERY | Age: 36
End: 2021-12-14
Payer: COMMERCIAL

## 2021-12-14 VITALS
OXYGEN SATURATION: 99 % | TEMPERATURE: 98 F | HEIGHT: 70 IN | DIASTOLIC BLOOD PRESSURE: 75 MMHG | WEIGHT: 200 LBS | SYSTOLIC BLOOD PRESSURE: 110 MMHG | HEART RATE: 84 BPM | BODY MASS INDEX: 28.63 KG/M2

## 2021-12-14 DIAGNOSIS — M79.18 CHRONIC PRIMARY MUSCULOSKELETAL PAIN: ICD-10-CM

## 2021-12-14 DIAGNOSIS — M54.50 LUMBAR PAIN: ICD-10-CM

## 2021-12-14 DIAGNOSIS — M54.2 NECK PAIN: Primary | ICD-10-CM

## 2021-12-14 DIAGNOSIS — G89.29 CHRONIC PRIMARY MUSCULOSKELETAL PAIN: ICD-10-CM

## 2021-12-14 PROCEDURE — 99204 OFFICE O/P NEW MOD 45 MIN: CPT | Performed by: NURSE PRACTITIONER

## 2021-12-14 RX ORDER — CYCLOBENZAPRINE HCL 10 MG
10 TABLET ORAL
Qty: 90 TABLET | Refills: 2 | Status: SHIPPED | OUTPATIENT
Start: 2021-12-14 | End: 2022-05-04 | Stop reason: SDUPTHER

## 2021-12-14 RX ORDER — BUTALBITAL, ACETAMINOPHEN AND CAFFEINE 50; 325; 40 MG/1; MG/1; MG/1
TABLET ORAL
COMMUNITY
Start: 2021-07-01

## 2021-12-14 RX ORDER — TRAMADOL HYDROCHLORIDE 50 MG/1
50 TABLET ORAL
Qty: 28 TABLET | Refills: 0 | Status: SHIPPED | OUTPATIENT
Start: 2021-12-14 | End: 2021-12-21

## 2021-12-14 NOTE — PROGRESS NOTES
Cherry Rondon presents today for   Chief Complaint   Patient presents with    Neck Pain     follow up    Back Pain     follow up       Is someone accompanying this pt? no    Is the patient using any DME equipment during OV? no    Depression Screening:  3 most recent PHQ Screens 11/6/2019   Little interest or pleasure in doing things Not at all   Feeling down, depressed, irritable, or hopeless Not at all   Total Score PHQ 2 0       Learning Assessment:  Learning Assessment 10/21/2019   PRIMARY LEARNER Patient   PRIMARY LANGUAGE ENGLISH   LEARNER PREFERENCE PRIMARY DEMONSTRATION   ANSWERED BY patient   RELATIONSHIP SELF     OPIOID RISK TOOL  Opioid Risk Tool 7/25/2019   Family history of alcohol abuse? 0   Family history of illegal drug abuse? 0   Family history of prescription drug abuse? 0   Personal history of alcohol abuse? 0   Personal history of illegal drug abuse? 0   Personal history of prescription drug abuse? 0   Age range between 17-45? 1   History of preadolescent sexual abuse? 0   ADD, OCD, bipolar, schizophrenia? 0   Depression? 0   Opioid Risk Total Score 1       Coordination of Care:  1. Have you been to the ER, urgent care clinic since your last visit? no  Hospitalized since your last visit? no    2. Have you seen or consulted any other health care providers outside of the [address] [place] [address] since your last visit? Yes, pcp Include any pap smears or colon screening.  no

## 2021-12-14 NOTE — PROGRESS NOTES
Chief complaint   Chief Complaint   Patient presents with    Neck Pain     follow up    Back Pain     follow up       History of Present Illness:  Richie Burnett is a  29 y.o.  female who comes in today after last being seen September 29, 2020 by [facility]. At that appointment he sent her to pain management for cervical injections. She did not do the injections but she states that did not turn out very well. She continues to have neck pain and back pain. She really does not have much in the way of radicular pain. Her previous MRIs have shown lumbar degenerative disc disease along with cervical degenerative disease with some mild stenosis. She states she manages her pain with Flexeril that she takes just as needed and also a very rare tramadol. She would like to go ahead and get refills of these as they do help her considerably. She denies fever bowel bladder dysfunction. 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  She vapes with nicotine. Physical Exam: The patient is a 43-year-old female well-developed well-nourished who is alert and oriented with a normal mood and affect. She has a full weightbearing antalgic gait. She did not use any assist device. She has 5 out of 5 strength bilateral upper and lower extremities. Negative straight leg raise. Negative Brady's. She has pain with hyperextension lumbar spine. Assessment and Plan: This is a patient who has cervical and lumbar degenerative disc disease along with some stenosis in her cervical spine. She is managing with a rare tramadol and as needed Flexeril. I will give her a prescription of each. We will see her back in 1 year sooner if needed.       Medications:  Current Outpatient Medications   Medication Sig Dispense Refill    butalbital-acetaminophen-caffeine (FIORICET, ESGIC) -40 mg per tablet 1-2 tabs every 4-6 hours as needed for migraine headache, max 5 days/month      cyclobenzaprine (FLEXERIL) 10 mg tablet Take 1 Tablet by mouth three (3) times daily as needed for Muscle Spasm(s). 90 Tablet 2    traMADoL (Ultram) 50 mg tablet Take 1 Tablet by mouth every six (6) hours as needed for Pain for up to 7 days. Max Daily Amount: 200 mg. Indications: pain 28 Tablet 0    montelukast (SINGULAIR) 10 mg tablet Take 10 mg by mouth daily.  ALPRAZolam (XANAX) 1 mg tablet Take 1 mg by mouth nightly as needed.  topiramate (TOPAMAX) 50 mg tablet Take 150 mg by mouth daily.  dextroamphetamine-amphetamine (ADDERALL) 30 mg tablet Take 30 mg by mouth three (3) times daily.  cetirizine (ZYRTEC) 10 mg tablet Take 10 mg by mouth daily.  diclofenac (VOLTAREN) 1 % gel diclofenac 1 % topical gel      naloxone (NARCAN) 4 mg/actuation nasal spray Use 1 spray intranasally, then discard. Repeat with new spray every 2 min as needed for opioid overdose symptoms, alternating nostrils.  2 Each 0           Review of systems:    Past Medical History:   Diagnosis Date    ADHD     Allergies     Anxiety     Migraines      Past Surgical History:   Procedure Laterality Date    HX APPENDECTOMY  2005    HX HYSTERECTOMY  2005     Social History     Socioeconomic History    Marital status:      Spouse name: Not on file    Number of children: Not on file    Years of education: Not on file    Highest education level: Not on file   Occupational History    Not on file   Tobacco Use    Smoking status: Former Smoker    Smokeless tobacco: Current User    Tobacco comment: Vapor   Substance and Sexual Activity    Alcohol use: Never    Drug use: Never    Sexual activity: Not on file   Other Topics Concern    Not on file   Social History Narrative    Not on file     Social Determinants of Health     Financial Resource Strain:     Difficulty of Paying Living Expenses: Not on file   Food Insecurity:     Worried About [address] in the Last Year: Not on file    [address] in the Last Year: Not on file   Transportation Needs:     Lack of Transportation (Medical): Not on file    Lack of Transportation (Non-Medical): Not on file   Physical Activity:     Days of Exercise per Week: Not on file    Minutes of Exercise per Session: Not on file   Stress:     Feeling of Stress : Not on file   Social Connections:     Frequency of Communication with Friends and Family: Not on file    Frequency of Social Gatherings with Friends and Family: Not on file    Attends Scientology Services: Not on file    Active Member of [address] [place] [address] [employer] or Organizations: Not on file    Attends Club or Organization Meetings: Not on file    Marital Status: Not on file   Intimate Partner Violence:     Fear of Current or Ex-Partner: Not on file    Emotionally Abused: Not on file    Physically Abused: Not on file    Sexually Abused: Not on file   Housing Stability:     Unable to Pay for Housing in the Last Year: Not on file    Number of [place] in the Last Year: Not on file    Unstable Housing in the Last Year: Not on file     Family History   Family history unknown: Yes       Physical Exam:  Visit Vitals  /75 (BP 1 Location: Left upper arm, BP Patient Position: Sitting, BP Cuff Size: Large adult)   Pulse 84   Temp 98 °F (36.7 °C) (Skin)   Ht 5' 10\" (1.778 m)   Wt 200 lb (90.7 kg)   SpO2 99%   BMI 28.70 kg/m²     Pain Scale: 4/10       has been . reviewed and is appropriate          Diagnoses and all orders for this visit:    1. Neck pain  -     traMADoL (Ultram) 50 mg tablet; Take 1 Tablet by mouth every six (6) hours as needed for Pain for up to 7 days. Max Daily Amount: 200 mg. Indications: pain    2. Chronic primary musculoskeletal pain  -     cyclobenzaprine (FLEXERIL) 10 mg tablet; Take 1 Tablet by mouth three (3) times daily as needed for Muscle Spasm(s). 3. Lumbar pain  -     traMADoL (Ultram) 50 mg tablet; Take 1 Tablet by mouth every six (6) hours as needed for Pain for up to 7 days. Max Daily Amount: 200 mg. Indications: pain            Follow-up and Dispositions    · Return in about 1 year (around 12/14/2022) for With nurse practitioner Ubaldo Damon.              We have informed Jose Luis Long to notify us for immediate appointment if she has any worsening neurogical symptoms or if an emergency situation presents, then call [phone]

## 2022-05-04 ENCOUNTER — OFFICE VISIT (OUTPATIENT)
Dept: ORTHOPEDIC SURGERY | Age: 37
End: 2022-05-04
Payer: COMMERCIAL

## 2022-05-04 ENCOUNTER — DOCUMENTATION ONLY (OUTPATIENT)
Dept: ORTHOPEDIC SURGERY | Age: 37
End: 2022-05-04

## 2022-05-04 VITALS
BODY MASS INDEX: 28.92 KG/M2 | HEART RATE: 85 BPM | WEIGHT: 202 LBS | TEMPERATURE: 98.4 F | OXYGEN SATURATION: 99 % | HEIGHT: 70 IN

## 2022-05-04 DIAGNOSIS — M54.16 LUMBAR RADICULOPATHY: ICD-10-CM

## 2022-05-04 DIAGNOSIS — G89.29 CHRONIC PRIMARY MUSCULOSKELETAL PAIN: ICD-10-CM

## 2022-05-04 DIAGNOSIS — M79.18 CHRONIC PRIMARY MUSCULOSKELETAL PAIN: ICD-10-CM

## 2022-05-04 DIAGNOSIS — M54.50 LUMBAR PAIN: Primary | ICD-10-CM

## 2022-05-04 DIAGNOSIS — M51.36 DDD (DEGENERATIVE DISC DISEASE), LUMBAR: ICD-10-CM

## 2022-05-04 PROCEDURE — 99214 OFFICE O/P EST MOD 30 MIN: CPT | Performed by: NURSE PRACTITIONER

## 2022-05-04 RX ORDER — METHYLPREDNISOLONE 4 MG/1
TABLET ORAL
Qty: 1 DOSE PACK | Refills: 0 | Status: SHIPPED | OUTPATIENT
Start: 2022-05-04

## 2022-05-04 RX ORDER — CYCLOBENZAPRINE HCL 10 MG
10 TABLET ORAL
Qty: 90 TABLET | Refills: 2 | Status: SHIPPED | OUTPATIENT
Start: 2022-05-04

## 2022-05-04 NOTE — PROGRESS NOTES
Attempted to collect payment of $40 however the payment said pending on the patient's bank account, however the payment processing was unable to collect payment still requesting payment and did not provide a receipt.

## 2022-05-04 NOTE — PROGRESS NOTES
Jerry Meyers presents today for   Chief Complaint   Patient presents with    Back Pain       Is someone accompanying this pt? no    Is the patient using any DME equipment during OV? no    Depression Screening:  3 most recent PHQ Screens 11/6/2019   Little interest or pleasure in doing things Not at all   Feeling down, depressed, irritable, or hopeless Not at all   Total Score PHQ 2 0       Learning Assessment:  Learning Assessment 10/21/2019   PRIMARY LEARNER Patient   PRIMARY LANGUAGE ENGLISH   LEARNER PREFERENCE PRIMARY DEMONSTRATION   ANSWERED BY patient   RELATIONSHIP SELF       OPIOID RISK TOOL  Opioid Risk Tool 7/25/2019   Family history of alcohol abuse? 0   Family history of illegal drug abuse? 0   Family history of prescription drug abuse? 0   Personal history of alcohol abuse? 0   Personal history of illegal drug abuse? 0   Personal history of prescription drug abuse? 0   Age range between 17-45? 1   History of preadolescent sexual abuse? 0   ADD, OCD, bipolar, schizophrenia? 0   Depression? 0   Opioid Risk Total Score 1       Coordination of Care:  1. Have you been to the ER, urgent care clinic since your last visit? no  Hospitalized since your last visit? no    2. Have you seen or consulted any other health care providers outside of the [address] [place] [address] since your last visit? Yes, pcp Include any pap smears or colon screening.  no
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
## 2024-07-22 ENCOUNTER — OFFICE VISIT (OUTPATIENT)
Age: 39
End: 2024-07-22
Payer: COMMERCIAL

## 2024-07-22 VITALS — HEIGHT: 70 IN | BODY MASS INDEX: 28.2 KG/M2 | WEIGHT: 197 LBS | TEMPERATURE: 99.1 F

## 2024-07-22 DIAGNOSIS — M17.11 ARTHRITIS OF RIGHT KNEE: ICD-10-CM

## 2024-07-22 DIAGNOSIS — Z87.828 HISTORY OF MOTOR VEHICLE ACCIDENT: ICD-10-CM

## 2024-07-22 DIAGNOSIS — M25.561 CHRONIC PAIN OF RIGHT KNEE: Primary | ICD-10-CM

## 2024-07-22 DIAGNOSIS — G89.29 CHRONIC PAIN OF RIGHT KNEE: Primary | ICD-10-CM

## 2024-07-22 PROCEDURE — 73562 X-RAY EXAM OF KNEE 3: CPT | Performed by: PHYSICIAN ASSISTANT

## 2024-07-22 PROCEDURE — 99204 OFFICE O/P NEW MOD 45 MIN: CPT | Performed by: PHYSICIAN ASSISTANT

## 2024-07-22 NOTE — PROGRESS NOTES
satisfaction.        Special note: Medication management discussed and patient will begin the following per recommended dosing.    (  ) Flector patch 1.3% topically twice daily to area of concern    (  ) Flexeril 10mg po 3 x daily as needed muscle spasm / pain    (  ) Physical therapy ordered for range of motion all modalities, stretching, range of motion of specific functional group associated     with primary treating condition,  gentle strengthening of musculature with attention to increasing endurance, gait training,balance and fine motor coordination.       Special note: I reviewed and agree with the PFSH and the ROS as well as the intake form in the chart in the record.  I have reviewed prior medical record(s) in detail regarding this patient in this care appointment.            Appropriate for outpatient management. Will discuss supportive treatment, NSAIDS, RICE and orthopedic follow-up. Discussed treatment plan, return precautions, symptomatic relief, and expected time to improvement. All questions answered. Patient is stable for discharge and outpatient management. Medication use, risk/benefit, side effects, and precautions discussed.        Care plan outlined and precautions discussed.  Results were reviewed with the patient. All medications were reviewed with the patient. All of pt's questions and concerns were addressed.  Alarm symptoms and return precautions associated with chief complaint and evaluation were reviewed with the patient in detail.  The patient demonstrated adequate understanding.The patient expresses willing compliance with the treatment plan.     Special note: Medication management discussed in detail all patient's questions answered to their satisfaction.        Patient provided a reminder for a \"due or due soon\" health maintenance. I have asked the patient to schedule an appointment with their primary care provider for follow-up on general health maintenance concerns.    Today all the

## 2024-10-23 ENCOUNTER — OFFICE VISIT (OUTPATIENT)
Age: 39
End: 2024-10-23
Payer: COMMERCIAL

## 2024-10-23 VITALS
HEART RATE: 75 BPM | WEIGHT: 197 LBS | SYSTOLIC BLOOD PRESSURE: 115 MMHG | TEMPERATURE: 97.5 F | BODY MASS INDEX: 28.2 KG/M2 | HEIGHT: 70 IN | OXYGEN SATURATION: 98 % | DIASTOLIC BLOOD PRESSURE: 75 MMHG

## 2024-10-23 DIAGNOSIS — M79.18 CERVICAL MYOFASCIAL PAIN SYNDROME: Primary | ICD-10-CM

## 2024-10-23 PROCEDURE — 99203 OFFICE O/P NEW LOW 30 MIN: CPT | Performed by: PHYSICAL MEDICINE & REHABILITATION

## 2024-10-23 RX ORDER — CYCLOBENZAPRINE HCL 10 MG
10 TABLET ORAL 3 TIMES DAILY PRN
Qty: 90 TABLET | Refills: 1 | Status: SHIPPED | OUTPATIENT
Start: 2024-10-23

## 2024-10-23 NOTE — PATIENT INSTRUCTIONS
your other arm.  Shoulder stretch    Relax your shoulders.  Raise one arm to shoulder height, and reach it across your chest.  Pull the arm slightly toward you with your other arm. This will help you get a gentle stretch. Hold for about 6 seconds.  Repeat 2 to 4 times.  Shoulder blade squeeze    Sit or stand up tall with your arms at your sides.  Keep your shoulders relaxed and down, not shrugged.  Squeeze your shoulder blades together. Hold for 6 seconds, then relax.  Repeat 8 to 12 times.  Straight-arm shoulder blade squeeze    Sit or stand tall. Relax your shoulders.  With palms down, hold your elastic tubing or band straight out in front of you.  Start with slight tension in the tubing or band, with your hands about shoulder-width apart.  Slowly pull straight out to the sides, squeezing your shoulder blades together. Keep your arms straight and at shoulder height. Slowly release.  Repeat 8 to 12 times.  Rowing    [place] your elastic tubing or band at about waist height. Take one end in each hand.  Sit or stand with your feet hip-width apart.  Hold your arms straight in front of you. Adjust your distance to create slight tension in the tubing or band.  Slightly tuck your chin. Relax your shoulders.  Without shrugging your shoulders, pull straight back. Your elbows will pass alongside your waist.  Pull-downs    [place] your elastic tubing or band in the top of a closed door. Take one end in each hand.  Either sit or stand, depending on what is more comfortable. If you feel unsteady, sit on a chair.  Start with your arms up and comfortably apart, elbows straight. There should be a slight tension in the tubing or band.  Slightly tuck your chin, and look straight ahead.  Keeping your back straight, slowly pull down and back, bending your elbows.  Stop where your hands are level with your chin, in a \"goalpost\" position.  Repeat 8 to 12 times.  Chest T stretch    Lie on your back. Raise your knees so they are bent.

## 2024-10-23 NOTE — PROGRESS NOTES
[facility]  [address] [place] [address], [address]  [place] [address]  Phone: [phone]  Fax: [phone]        Korin Hummel  : 1985  PCP: Eriberto Mancuso Jr., MD    NEW PATIENT EVALUATION      ASSESSMENT AND PLAN    Korin was seen today for lower back pain.    Diagnoses and all orders for this visit:    Cervical myofascial pain syndrome  -     cyclobenzaprine (FLEXERIL) 10 MG tablet; Take 1 tablet by mouth 3 times daily as needed for Muscle spasms         Korin Hummel is a 39 y.o. female [place]  with episodic cervical myofascial pain.  No evidence of radiculopathy or myelopathy on exam today.  Stressed the importance of chin tuck and other antigravity exercises.  May receive future refills through primary care physician if agreeable.    Follow-up and Dispositions    Return if symptoms worsen or fail to improve.         HISTORY OF PRESENT ILLNESS    Korin Hummel is seen today in consultation for neck pain and medication refill request. Notes will be sent to PCP Eriberto Mancuso Jr., MD.     Patient was seen by JANE Conteh (2022) with c/o LBP radiating into LLE. Given Rx MDP, Rf Flexeril, discussed Gabapentin Rx if symptoms worsen.     I have previously seen this patient (2019) for spondylosis of cervical region.       Patient reports flares of pain in neck and upper back. She describes the sensation as a tightness. 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 She reports intermittent numbness in B/L hands. She reports only notices weakness in B/L hands with intense flares.     Patient is expressing interest in Rf of Flexeril. Patient reports benefit on Flexeril. She reports only taking Flexeril PRN when at home due to associated sedation. She does report waking the next morning after taking Flexeril with associated headaches. She endorses using Voltaren gel on B/L knees with benefit.

## 2024-10-23 NOTE — PROGRESS NOTES
Korin Hummel presents today for   Chief Complaint   Patient presents with    Lower Back Pain       Is someone accompanying this pt? no    Is the patient using any DME equipment during OV? no      Coordination of Care:  1. Have you been to the ER, urgent care clinic since your last visit? no  Hospitalized since your last visit? no    2. Have you seen or consulted any other health care providers outside of the [facility] since your last visit? Yes, ortho Include any pap smears or colon screening. no

## 2025-04-29 NOTE — PATIENT INSTRUCTIONS
Cyclobenzaprine (Flexeril, Amrix, Fexmid, FusePaq Tabradol) - (By mouth)   Why this medicine is used:   Treats pain and stiffness caused by muscle spasms. Contact a nurse or doctor right away if you have:  · Anxiety, restlessness, twitching  · Seeing or hearing things that are not there  · Fast, pounding, or uneven heartbeat  · Fever, sweating, nausea, vomiting, diarrhea     Common side effects:  · Dry mouth  · Dizziness, drowsiness  © 2017 [facility] Information is for End User's use only and may not be sold, redistributed or otherwise used for commercial purposes.
Home